# Patient Record
Sex: FEMALE | Race: BLACK OR AFRICAN AMERICAN | Employment: FULL TIME | URBAN - METROPOLITAN AREA
[De-identification: names, ages, dates, MRNs, and addresses within clinical notes are randomized per-mention and may not be internally consistent; named-entity substitution may affect disease eponyms.]

---

## 2018-04-12 PROBLEM — N92.6 IRREGULAR MENSTRUAL CYCLE: Status: ACTIVE | Noted: 2018-04-12

## 2018-06-21 PROBLEM — E66.01 SEVERE OBESITY (BMI 35.0-39.9): Status: ACTIVE | Noted: 2018-06-21

## 2018-06-21 PROBLEM — F43.81 PROLONGED GRIEF REACTION: Status: ACTIVE | Noted: 2018-06-21

## 2018-06-27 ENCOUNTER — HOSPITAL ENCOUNTER (EMERGENCY)
Age: 48
Discharge: HOME | End: 2018-06-27
Attending: EMERGENCY MEDICINE
Payer: OTHER GOVERNMENT

## 2018-06-27 VITALS
DIASTOLIC BLOOD PRESSURE: 79 MMHG | SYSTOLIC BLOOD PRESSURE: 119 MMHG | RESPIRATION RATE: 11 BRPM | OXYGEN SATURATION: 93 % | BODY MASS INDEX: 39.27 KG/M2 | TEMPERATURE: 98.3 F | HEIGHT: 61 IN | HEART RATE: 91 BPM | WEIGHT: 208 LBS

## 2018-06-27 DIAGNOSIS — R53.83 FATIGUE, UNSPECIFIED TYPE: Primary | ICD-10-CM

## 2018-06-27 DIAGNOSIS — M54.2 NECK PAIN: ICD-10-CM

## 2018-06-27 LAB
ALBUMIN SERPL-MCNC: 3.9 G/DL (ref 3.5–5)
ALBUMIN/GLOB SERPL: 0.8 {RATIO} (ref 1.2–3.5)
ALP SERPL-CCNC: 137 U/L (ref 50–136)
ALT SERPL-CCNC: 33 U/L (ref 12–65)
ANION GAP SERPL CALC-SCNC: 13 MMOL/L (ref 7–16)
AST SERPL-CCNC: 37 U/L (ref 15–37)
ATRIAL RATE: 104 BPM
BACTERIA URNS QL MICRO: 0 /HPF
BASOPHILS # BLD: 0 K/UL (ref 0–0.2)
BASOPHILS NFR BLD: 1 % (ref 0–2)
BILIRUB SERPL-MCNC: 0.5 MG/DL (ref 0.2–1.1)
BUN SERPL-MCNC: 20 MG/DL (ref 6–23)
CALCIUM SERPL-MCNC: 9.8 MG/DL (ref 8.3–10.4)
CALCULATED P AXIS, ECG09: 47 DEGREES
CALCULATED R AXIS, ECG10: 69 DEGREES
CALCULATED T AXIS, ECG11: 55 DEGREES
CASTS URNS QL MICRO: NORMAL /LPF
CHLORIDE SERPL-SCNC: 98 MMOL/L (ref 98–107)
CO2 SERPL-SCNC: 28 MMOL/L (ref 21–32)
CREAT SERPL-MCNC: 1.18 MG/DL (ref 0.6–1)
DIAGNOSIS, 93000: NORMAL
DIFFERENTIAL METHOD BLD: ABNORMAL
EOSINOPHIL # BLD: 0.1 K/UL (ref 0–0.8)
EOSINOPHIL NFR BLD: 2 % (ref 0.5–7.8)
EPI CELLS #/AREA URNS HPF: NORMAL /HPF
ERYTHROCYTE [DISTWIDTH] IN BLOOD BY AUTOMATED COUNT: 13 % (ref 11.9–14.6)
GLOBULIN SER CALC-MCNC: 5 G/DL (ref 2.3–3.5)
GLUCOSE SERPL-MCNC: 109 MG/DL (ref 65–100)
HCT VFR BLD AUTO: 41.9 % (ref 35.8–46.3)
HGB BLD-MCNC: 14.3 G/DL (ref 11.7–15.4)
IMM GRANULOCYTES # BLD: 0 K/UL (ref 0–0.5)
IMM GRANULOCYTES NFR BLD AUTO: 0 % (ref 0–5)
LYMPHOCYTES # BLD: 3.2 K/UL (ref 0.5–4.6)
LYMPHOCYTES NFR BLD: 46 % (ref 13–44)
MCH RBC QN AUTO: 28.5 PG (ref 26.1–32.9)
MCHC RBC AUTO-ENTMCNC: 34.1 G/DL (ref 31.4–35)
MCV RBC AUTO: 83.6 FL (ref 79.6–97.8)
MONOCYTES # BLD: 0.4 K/UL (ref 0.1–1.3)
MONOCYTES NFR BLD: 6 % (ref 4–12)
NEUTS SEG # BLD: 3.1 K/UL (ref 1.7–8.2)
NEUTS SEG NFR BLD: 45 % (ref 43–78)
P-R INTERVAL, ECG05: 140 MS
PLATELET # BLD AUTO: 467 K/UL (ref 150–450)
PMV BLD AUTO: 10.1 FL (ref 10.8–14.1)
POTASSIUM SERPL-SCNC: 4 MMOL/L (ref 3.5–5.1)
PROT SERPL-MCNC: 8.9 G/DL (ref 6.3–8.2)
Q-T INTERVAL, ECG07: 318 MS
QRS DURATION, ECG06: 76 MS
QTC CALCULATION (BEZET), ECG08: 418 MS
RBC # BLD AUTO: 5.01 M/UL (ref 4.05–5.25)
RBC #/AREA URNS HPF: NORMAL /HPF
SODIUM SERPL-SCNC: 139 MMOL/L (ref 136–145)
TROPONIN I SERPL-MCNC: <0.04 NG/ML (ref 0.02–0.05)
TSH SERPL DL<=0.005 MIU/L-ACNC: 5.95 UIU/ML (ref 0.36–3.74)
VENTRICULAR RATE, ECG03: 104 BPM
WBC # BLD AUTO: 6.8 K/UL (ref 4.3–11.1)
WBC URNS QL MICRO: NORMAL /HPF

## 2018-06-27 PROCEDURE — 80053 COMPREHEN METABOLIC PANEL: CPT | Performed by: EMERGENCY MEDICINE

## 2018-06-27 PROCEDURE — 81003 URINALYSIS AUTO W/O SCOPE: CPT | Performed by: EMERGENCY MEDICINE

## 2018-06-27 PROCEDURE — 93005 ELECTROCARDIOGRAM TRACING: CPT | Performed by: EMERGENCY MEDICINE

## 2018-06-27 PROCEDURE — 84443 ASSAY THYROID STIM HORMONE: CPT | Performed by: EMERGENCY MEDICINE

## 2018-06-27 PROCEDURE — 99285 EMERGENCY DEPT VISIT HI MDM: CPT | Performed by: EMERGENCY MEDICINE

## 2018-06-27 PROCEDURE — 84484 ASSAY OF TROPONIN QUANT: CPT | Performed by: EMERGENCY MEDICINE

## 2018-06-27 PROCEDURE — 96360 HYDRATION IV INFUSION INIT: CPT | Performed by: EMERGENCY MEDICINE

## 2018-06-27 PROCEDURE — 81015 MICROSCOPIC EXAM OF URINE: CPT | Performed by: EMERGENCY MEDICINE

## 2018-06-27 PROCEDURE — 85025 COMPLETE CBC W/AUTO DIFF WBC: CPT | Performed by: EMERGENCY MEDICINE

## 2018-06-27 PROCEDURE — 74011250636 HC RX REV CODE- 250/636: Performed by: EMERGENCY MEDICINE

## 2018-06-27 RX ADMIN — SODIUM CHLORIDE 1000 ML: 900 INJECTION, SOLUTION INTRAVENOUS at 13:22

## 2018-06-27 NOTE — ED NOTES
I have reviewed discharge instructions with the patient. The patient verbalized understanding. Patient left ED via Discharge Method: ambulatory to Home with (self). Opportunity for questions and clarification provided. Patient given 0 scripts. To continue your aftercare when you leave the hospital, you may receive an automated call from our care team to check in on how you are doing. This is a free service and part of our promise to provide the best care and service to meet your aftercare needs.  If you have questions, or wish to unsubscribe from this service please call [phone]. Thank you for Choosing our [facility] Emergency Department.

## 2018-06-27 NOTE — ED TRIAGE NOTES
Pt arrives via POV from home, pt ambulates to triage with ease, pt states she has been lethargic, had some back and neck pain, spasming. Pt states n/v/d. Pt states she has a lot of stress in her life, pt is crying in triage, was put on wellbutrin medication last week by PCP but has not been taking it because she is upset that that means she has a depression problem, states she will not go back to him. Pt refused recheck of BP after machine timed out.

## 2018-06-27 NOTE — DISCHARGE INSTRUCTIONS
Back Pain: Care Instructions  Your Care Instructions    Back pain has many possible causes. It is often related to problems with muscles and ligaments of the back. It may also be related to problems with the nerves, discs, or bones of the back. Moving, lifting, standing, sitting, or sleeping in an awkward way can strain the back. Sometimes you don't notice the injury until later. Arthritis is another common cause of back pain. Although it may hurt a lot, back pain usually improves on its own within several weeks. Most people recover in 12 weeks or less. Using good home treatment and being careful not to stress your back can help you feel better sooner. Follow-up care is a key part of your treatment and safety. Be sure to make and go to all appointments, and call your doctor if you are having problems. It's also a good idea to know your test results and keep a list of the medicines you take. How can you care for yourself at home? · Sit or lie in positions that are most comfortable and reduce your pain. Try one of these positions when you lie down:  ¨ Lie on your back with your knees bent and supported by large pillows. ¨ Lie on the floor with your legs on the seat of a sofa or chair. Jeffory Amass on your side with your knees and hips bent and a pillow between your legs. ¨ Lie on your stomach if it does not make pain worse. · Do not sit up in bed, and avoid soft couches and twisted positions. Bed rest can help relieve pain at first, but it delays healing. Avoid bed rest after the first day of back pain. · Change positions every 30 minutes. If you must sit for long periods of time, take breaks from sitting. Get up and walk around, or lie in a comfortable position. · Try using a heating pad on a low or medium setting for 15 to 20 minutes every 2 or 3 hours. Try a warm shower in place of one session with the heating pad. · You can also try an ice pack for 10 to 15 minutes every 2 to 3 hours.  Put a thin cloth between the ice pack and your skin. · Take pain medicines exactly as directed. ¨ If the doctor gave you a prescription medicine for pain, take it as prescribed. ¨ If you are not taking a prescription pain medicine, ask your doctor if you can take an over-the-counter medicine. · Take short walks several times a day. You can start with 5 to 10 minutes, 3 or 4 times a day, and work up to longer walks. Walk on level surfaces and avoid hills and stairs until your back is better. · Return to work and other activities as soon as you can. Continued rest without activity is usually not good for your back. · To prevent future back pain, do exercises to stretch and strengthen your back and stomach. Learn how to use good posture, safe lifting techniques, and proper body mechanics. When should you call for help? Call your doctor now or seek immediate medical care if:  ? · You have new or worsening numbness in your legs. ? · You have new or worsening weakness in your legs. (This could make it hard to stand up.)   ? · You lose control of your bladder or bowels. ? Watch closely for changes in your health, and be sure to contact your doctor if:  ? · Your pain gets worse. ? · You are not getting better after 2 weeks. Where can you learn more? Go to http://sharron-jennifer.info/. Enter G944 in the search box to learn more about \"Back Pain: Care Instructions. \"  Current as of: March 21, 2017  Content Version: 11.4  © 6978-7867 [employer]. Care instructions adapted under license by [employer] (which disclaims liability or warranty for this information). If you have questions about a medical condition or this instruction, always ask your healthcare professional. [place] [address] any warranty or liability for your use of this information.          Neck Pain: Care Instructions  Your Care Instructions    You can have neck pain anywhere from the bottom of your head to the top of your shoulders. It can spread to the upper back or arms. Injuries, painting a ceiling, sleeping with your neck twisted, staying in one position for too long, and many other activities can cause neck pain. Most neck pain gets better with home care. Your doctor may recommend medicine to relieve pain or relax your muscles. He or she may suggest exercise and physical therapy to increase flexibility and relieve stress. You may need to wear a special (cervical) collar to support your neck for a day or two. Follow-up care is a key part of your treatment and safety. Be sure to make and go to all appointments, and call your doctor if you are having problems. It's also a good idea to know your test results and keep a list of the medicines you take. How can you care for yourself at home? · Try using a heating pad on a low or medium setting for 15 to 20 minutes every 2 or 3 hours. Try a warm shower in place of one session with the heating pad. · You can also try an ice pack for 10 to 15 minutes every 2 to 3 hours. Put a thin cloth between the ice and your skin. · Take pain medicines exactly as directed. ¨ If the doctor gave you a prescription medicine for pain, take it as prescribed. ¨ If you are not taking a prescription pain medicine, ask your doctor if you can take an over-the-counter medicine. · If your doctor recommends a cervical collar, wear it exactly as directed. When should you call for help? Call your doctor now or seek immediate medical care if:  ? · You have new or worsening numbness in your arms, buttocks or legs. ? · You have new or worsening weakness in your arms or legs. (This could make it hard to stand up.)   ? · You lose control of your bladder or bowels. ? Watch closely for changes in your health, and be sure to contact your doctor if:  ? · Your neck pain is getting worse. ? · You are not getting better after 1 week. ? · You do not get better as expected.    Where can you learn more?  Go to http://sharron-jennifer.info/. Enter [id number] in the search box to learn more about \"Neck Pain: Care Instructions. \"  Current as of: March 21, 2017  Content Version: 11.4  © 9207-5258 [employer]. Care instructions adapted under license by [employer] (which disclaims liability or warranty for this information). If you have questions about a medical condition or this instruction, always ask your healthcare professional. [address] any warranty or liability for your use of this information. Fatigue: Care Instructions  Your Care Instructions    Fatigue is a feeling of tiredness, exhaustion, or lack of energy. You may feel fatigue because of too much or not enough activity. It can also come from stress, lack of sleep, boredom, and poor diet. Many medical problems, such as viral infections, can cause fatigue. Emotional problems, especially depression, are often the cause of fatigue. Fatigue is most often a symptom of another problem. Treatment for fatigue depends on the cause. For example, if you have fatigue because you have a certain health problem, treating this problem also treats your fatigue. If depression or anxiety is the cause, treatment may help. Follow-up care is a key part of your treatment and safety. Be sure to make and go to all appointments, and call your doctor if you are having problems. It's also a good idea to know your test results and keep a list of the medicines you take. How can you care for yourself at home? · Get regular exercise. But don't overdo it. Go back and forth between rest and exercise. · Get plenty of rest.  · Eat a healthy diet. Do not skip meals, especially breakfast.  · Reduce your use of caffeine, tobacco, and alcohol. Caffeine is most often found in coffee, tea, cola drinks, and chocolate. · Limit medicines that can cause fatigue. This includes tranquilizers and cold and allergy medicines.   When should you call for help? Watch closely for changes in your health, and be sure to contact your doctor if:  ? · You have new symptoms such as fever or a rash. ? · Your fatigue gets worse. ? · You have been feeling down, depressed, or hopeless. Or you may have lost interest in things that you usually enjoy. ? · You are not getting better as expected. Where can you learn more? Go to http://sharron-jennifer.info/. Enter U052 in the search box to learn more about \"Fatigue: Care Instructions. \"  Current as of: March 20, 2017  Content Version: 11.4  © 7914-3225 [employer]. Care instructions adapted under license by [employer] (which disclaims liability or warranty for this information). If you have questions about a medical condition or this instruction, always ask your healthcare professional. [address] any warranty or liability for your use of this information.

## 2018-06-27 NOTE — ED PROVIDER NOTES
HPI Comments: Patient is a 77-year-old female presenting with multiple complaints. She states over the past several weeks she's been feeling increasingly fatigued. She cannot explain why this might be. She also mentions she's had the burning sensation down her neck and back midline region. No tingling or numbness. She states this is associated with spasms in various locations throughout her body including her toes and fingers. Reports she saw her PCP recently was started on blood pressure medication as well as Wellbutrin. She has not been taking these. She denies any significant nausea, vomiting, shortness of breath, chest pain or abdominal pain. She has undergone a great deal of stressors over the past year. She states her son was murdered on Thanksgiving and her mom  at [place]    Patient is a 49 y.o. female presenting with fatigue. The history is provided by the patient. No  was used. Fatigue   Pertinent negatives include no shortness of breath, no confusion, no headaches and no nausea. Past Medical History:   Diagnosis Date    Anxiety 2013    Bacterial vaginosis 2014    Bronchitis     DJD (degenerative joint disease) of knee     Environmental allergies     Fibrocystic breast changes     HTN (hypertension) 2013    HTN (hypertension) 2013    Influenza B     Vaginitis 2014    Vitamin D deficiency 2014       Past Surgical History:   Procedure Laterality Date    BREAST SURGERY PROCEDURE UNLISTED      cyst removed left breast    HX BACK SURGERY      HX GYN      c-sect    HX HYSTERECTOMY           Family History:   Problem Relation Age of Onset    Hypertension Mother     Hypertension Father     Cancer Father        Social History     Social History    Marital status:      Spouse name: N/A    Number of children: N/A    Years of education: N/A     Occupational History    Not on file.      Social History Main Topics    Smoking status: Never Smoker    Smokeless tobacco: Never Used    Alcohol use No    Drug use: No    Sexual activity: Not on file     Other Topics Concern    Not on file     Social History Narrative         ALLERGIES: Codeine and Oxycodone-acetaminophen    Review of Systems   Constitutional: Positive for fatigue. Negative for fever. HENT: Negative for sore throat. Respiratory: Negative for cough, chest tightness and shortness of breath. Cardiovascular: Negative for leg swelling. Gastrointestinal: Negative for abdominal pain and nausea. Genitourinary: Negative for dysuria. Musculoskeletal: Positive for back pain, neck pain and neck stiffness. Neurological: Negative for syncope and headaches. Psychiatric/Behavioral: Negative for confusion. Vitals:    06/27/18 1257 06/27/18 1326 06/27/18 1346   BP:  (!) 156/94    Pulse: (!) 126  95   Resp: 16     Temp: 98.3 °F (36.8 °C)     SpO2: 97% 100%    Weight: 94.3 kg (208 lb)     Height: 5' 1\" (1.549 m)              Physical Exam   Constitutional: She is oriented to person, place, and time. She appears well-developed and well-nourished. No distress. HENT:   Head: Normocephalic and atraumatic. Eyes: Conjunctivae and EOM are normal. Pupils are equal, round, and reactive to light. Neck: Normal range of motion. Neck supple. Cardiovascular: Normal rate, regular rhythm and normal heart sounds. Pulmonary/Chest: Effort normal and breath sounds normal. No respiratory distress. She has no wheezes. She has no rales. Abdominal: Soft. She exhibits no distension. There is no tenderness. There is no rebound. Musculoskeletal: Normal range of motion. She exhibits tenderness. She exhibits no edema. Patient has no significant pain to palpation of the neck or upper back at this time. She states the pains went away. She has full range of motion   Neurological: She is alert and oriented to person, place, and time. Skin: Skin is warm and dry. No rash noted. She is not diaphoretic. Psychiatric: She has a normal mood and affect. Her behavior is normal.   Vitals reviewed. MDM  Number of Diagnoses or Management Options  Fatigue, unspecified type: new and does not require workup  Neck pain: new and does not require workup  Diagnosis management comments: Patient looks quite well and has been resting comfortably in the emergency department. TSH is slightly elevated. I suggested that she follow-up with a primary care provider. I believe the patient is depressed. She expressed understanding. Will discharge to home. Return precautions discussed      Mariah Gunn MD; 6/27/2018 @4:20 PM Voice dictation software was used during the making of this note. This software is not perfect and grammatical and other typographical errors may be present.   This note has not been proofread for errors.  ===================================================================         Amount and/or Complexity of Data Reviewed  Clinical lab tests: reviewed and ordered (Results for orders placed or performed during the hospital encounter of 06/27/18  -CBC WITH AUTOMATED DIFF       Result                                            Value                         Ref Range                       WBC                                               6.8                           4.3 - 11.1 K/uL                 RBC                                               5.01                          4.05 - 5.25 M/uL                HGB                                               14.3                          11.7 - 15.4 g/dL                HCT                                               41.9                          35.8 - 46.3 %                   MCV                                               83.6                          79.6 - 97.8 FL                  MCH                                               28.5                          26.1 - 32.9 PG                  [facility] 34.1                          31.4 - 35.0 g/dL                RDW                                               13.0                          11.9 - 14.6 %                   PLATELET                                          467 (H)                       150 - 450 K/uL                  MPV                                               10.1 (L)                      10.8 - 14.1 FL                  DF                                                AUTOMATED                                                     NEUTROPHILS                                       45                            43 - 78 %                       LYMPHOCYTES                                       46 (H)                        13 - 44 %                       MONOCYTES                                         6                             4.0 - 12.0 %                    EOSINOPHILS                                       2                             0.5 - 7.8 %                     BASOPHILS                                         1                             0.0 - 2.0 %                     IMMATURE GRANULOCYTES                             0                             0.0 - 5.0 %                     ABS. NEUTROPHILS                                  3.1                           1.7 - 8.2 K/UL                  ABS. LYMPHOCYTES                                  3.2                           0.5 - 4.6 K/UL                  ABS. MONOCYTES                                    0.4                           0.1 - 1.3 K/UL                  ABS. EOSINOPHILS                                  0.1                           0.0 - 0.8 K/UL                  ABS. BASOPHILS                                    0.0                           0.0 - 0.2 K/UL                  ABS. IMM.  GRANS.                                  0.0                           0.0 - 0.5 K/UL             -METABOLIC PANEL, COMPREHENSIVE       Result Value                         Ref Range                       Sodium                                            139                           136 - 145 mmol/L                Potassium                                         4.0                           3.5 - 5.1 mmol/L                Chloride                                          98                            98 - 107 mmol/L                 CO2                                               28                            21 - 32 mmol/L                  Anion gap                                         13                            7 - 16 mmol/L                   Glucose                                           109 (H)                       65 - 100 mg/dL                  BUN                                               20                            6 - 23 MG/DL                    Creatinine                                        1.18 (H)                      0.6 - 1.0 MG/DL                 GFR est AA                                        >60                           >60 ml/min/1.73m2               GFR est non-AA                                    52 (L)                        >60 ml/min/1.73m2               Calcium                                           9.8                           8.3 - 10.4 MG/DL                Bilirubin, total                                  0.5                           0.2 - 1.1 MG/DL                 ALT (SGPT)                                        33                            12 - 65 U/L                     AST (SGOT)                                        37                            15 - 37 U/L                     Alk. phosphatase                                  137 (H)                       50 - 136 U/L                    Protein, total                                    8.9 (H)                       6.3 - 8.2 g/dL                  Albumin                                           3.9                           3.5 - 5.0 g/dL                  Globulin                                          5.0 (H)                       2.3 - 3.5 g/dL                  A-G Ratio                                         0.8 (L)                       1.2 - 3.5                  -URINE MICROSCOPIC       Result                                            Value                         Ref Range                       WBC                                               0-3                           0 /hpf                          RBC                                               0-3                           0 /hpf                          Epithelial cells                                  3-5                           0 /hpf                          Bacteria                                          0                             0 /hpf                          Casts                                             3-5                           0 /lpf                     -TSH 3RD GENERATION       Result                                            Value                         Ref Range                       TSH                                               5.954 (H)                     0.358 - 3.740 uIU/mL       -TROPONIN I       Result                                            Value                         Ref Range                       Troponin-I, Qt.                                   <0.04                         0.02 - 0.05 NG/ML          -EKG, 12 LEAD, INITIAL       Result                                            Value                         Ref Range                       Ventricular Rate                                  104                           BPM                             Atrial Rate                                       104                           BPM                             P-R Interval                                      140                           ms                              QRS Duration 76                            ms                              Q-T Interval                                      318                           ms                              QTC Calculation (Bezet)                           418                           ms                              Calculated P Axis                                 47                            degrees                         Calculated R Axis                                 69                            degrees                         Calculated T Axis                                 55                            degrees                         Diagnosis                                                                                                   Sinus tachycardia   Otherwise normal ECG     )  Review and summarize past medical records: yes  Independent visualization of images, tracings, or specimens: yes    Risk of Complications, Morbidity, and/or Mortality  Presenting problems: moderate  Diagnostic procedures: moderate  Management options: moderate    Patient Progress  Patient progress: stable        ED Course       Procedures

## 2021-07-16 ENCOUNTER — HOSPITAL ENCOUNTER (OUTPATIENT)
Dept: LAB | Age: 51
Discharge: HOME | End: 2021-07-16
Payer: OTHER GOVERNMENT

## 2021-07-16 DIAGNOSIS — Z00.00 ENCOUNTER FOR MEDICAL EXAMINATION TO ESTABLISH CARE: ICD-10-CM

## 2021-07-16 DIAGNOSIS — Z13.29 SCREENING FOR THYROID DISORDER: ICD-10-CM

## 2021-07-16 DIAGNOSIS — Z13.6 SCREENING FOR ISCHEMIC HEART DISEASE: ICD-10-CM

## 2021-07-16 DIAGNOSIS — Z11.59 NEED FOR HEPATITIS C SCREENING TEST: ICD-10-CM

## 2021-07-16 PROBLEM — I10 HYPERTENSION: Status: ACTIVE | Noted: 2021-07-16

## 2021-07-16 LAB
ALBUMIN SERPL-MCNC: 3.6 G/DL (ref 3.5–5)
ALBUMIN/GLOB SERPL: 1.1 {RATIO} (ref 1.2–3.5)
ALP SERPL-CCNC: 102 U/L (ref 50–136)
ALT SERPL-CCNC: 34 U/L (ref 12–65)
ANION GAP SERPL CALC-SCNC: 7 MMOL/L (ref 7–16)
AST SERPL-CCNC: 22 U/L (ref 15–37)
BASOPHILS # BLD: 0.1 K/UL (ref 0–0.2)
BASOPHILS NFR BLD: 1 % (ref 0–2)
BILIRUB SERPL-MCNC: 0.3 MG/DL (ref 0.2–1.1)
BUN SERPL-MCNC: 14 MG/DL (ref 6–23)
CALCIUM SERPL-MCNC: 9.1 MG/DL (ref 8.3–10.4)
CHLORIDE SERPL-SCNC: 109 MMOL/L (ref 98–107)
CHOLEST SERPL-MCNC: 167 MG/DL
CO2 SERPL-SCNC: 26 MMOL/L (ref 21–32)
CREAT SERPL-MCNC: 0.81 MG/DL (ref 0.6–1)
DIFFERENTIAL METHOD BLD: ABNORMAL
EOSINOPHIL # BLD: 0.2 K/UL (ref 0–0.8)
EOSINOPHIL NFR BLD: 3 % (ref 0.5–7.8)
ERYTHROCYTE [DISTWIDTH] IN BLOOD BY AUTOMATED COUNT: 13.4 % (ref 11.9–14.6)
GLOBULIN SER CALC-MCNC: 3.4 G/DL (ref 2.3–3.5)
GLUCOSE SERPL-MCNC: 109 MG/DL (ref 65–100)
HCT VFR BLD AUTO: 35.5 % (ref 35.8–46.3)
HCV AB SER QL: NONREACTIVE
HDLC SERPL-MCNC: 51 MG/DL (ref 40–60)
HDLC SERPL: 3.3 {RATIO}
HGB BLD-MCNC: 11.4 G/DL (ref 11.7–15.4)
IMM GRANULOCYTES # BLD AUTO: 0 K/UL (ref 0–0.5)
IMM GRANULOCYTES NFR BLD AUTO: 0 % (ref 0–5)
LDLC SERPL CALC-MCNC: 100.6 MG/DL
LYMPHOCYTES # BLD: 3 K/UL (ref 0.5–4.6)
LYMPHOCYTES NFR BLD: 47 % (ref 13–44)
MCH RBC QN AUTO: 28.4 PG (ref 26.1–32.9)
MCHC RBC AUTO-ENTMCNC: 32.1 G/DL (ref 31.4–35)
MCV RBC AUTO: 88.3 FL (ref 79.6–97.8)
MONOCYTES # BLD: 0.3 K/UL (ref 0.1–1.3)
MONOCYTES NFR BLD: 5 % (ref 4–12)
NEUTS SEG # BLD: 2.8 K/UL (ref 1.7–8.2)
NEUTS SEG NFR BLD: 44 % (ref 43–78)
NRBC # BLD: 0 K/UL (ref 0–0.2)
PLATELET # BLD AUTO: 420 K/UL (ref 150–450)
PMV BLD AUTO: 10.6 FL (ref 9.4–12.3)
POTASSIUM SERPL-SCNC: 4 MMOL/L (ref 3.5–5.1)
PROT SERPL-MCNC: 7 G/DL (ref 6.3–8.2)
RBC # BLD AUTO: 4.02 M/UL (ref 4.05–5.2)
SODIUM SERPL-SCNC: 142 MMOL/L (ref 136–145)
T4 FREE SERPL-MCNC: 1 NG/DL (ref 0.78–1.46)
TRIGL SERPL-MCNC: 77 MG/DL (ref 35–150)
TSH SERPL DL<=0.005 MIU/L-ACNC: 3.83 UIU/ML (ref 0.36–3.74)
VLDLC SERPL CALC-MCNC: 15.4 MG/DL (ref 6–23)
WBC # BLD AUTO: 6.3 K/UL (ref 4.3–11.1)

## 2021-07-16 PROCEDURE — 85025 COMPLETE CBC W/AUTO DIFF WBC: CPT

## 2021-07-16 PROCEDURE — 84443 ASSAY THYROID STIM HORMONE: CPT

## 2021-07-16 PROCEDURE — 36415 COLL VENOUS BLD VENIPUNCTURE: CPT

## 2021-07-16 PROCEDURE — 84439 ASSAY OF FREE THYROXINE: CPT

## 2021-07-16 PROCEDURE — 80053 COMPREHEN METABOLIC PANEL: CPT

## 2021-07-16 PROCEDURE — 86803 HEPATITIS C AB TEST: CPT

## 2021-07-16 PROCEDURE — 80061 LIPID PANEL: CPT

## 2021-07-30 PROBLEM — R73.01 ELEVATED FASTING GLUCOSE: Status: ACTIVE | Noted: 2020-04-28

## 2021-08-14 ENCOUNTER — HOSPITAL ENCOUNTER (EMERGENCY)
Age: 51
Discharge: HOME | End: 2021-08-14
Attending: EMERGENCY MEDICINE
Payer: OTHER GOVERNMENT

## 2021-08-14 ENCOUNTER — APPOINTMENT (OUTPATIENT)
Dept: GENERAL RADIOLOGY | Age: 51
End: 2021-08-14
Attending: EMERGENCY MEDICINE
Payer: OTHER GOVERNMENT

## 2021-08-14 VITALS
BODY MASS INDEX: 39.65 KG/M2 | WEIGHT: 210 LBS | RESPIRATION RATE: 16 BRPM | HEIGHT: 61 IN | OXYGEN SATURATION: 98 % | TEMPERATURE: 98 F | SYSTOLIC BLOOD PRESSURE: 120 MMHG | HEART RATE: 106 BPM | DIASTOLIC BLOOD PRESSURE: 87 MMHG

## 2021-08-14 DIAGNOSIS — M54.9 ACUTE MIDLINE BACK PAIN, UNSPECIFIED BACK LOCATION: Primary | ICD-10-CM

## 2021-08-14 PROCEDURE — 72070 X-RAY EXAM THORAC SPINE 2VWS: CPT

## 2021-08-14 PROCEDURE — 74011250637 HC RX REV CODE- 250/637: Performed by: EMERGENCY MEDICINE

## 2021-08-14 PROCEDURE — 99283 EMERGENCY DEPT VISIT LOW MDM: CPT

## 2021-08-14 PROCEDURE — 72100 X-RAY EXAM L-S SPINE 2/3 VWS: CPT

## 2021-08-14 RX ORDER — HYDROCODONE BITARTRATE AND ACETAMINOPHEN 10; 325 MG/1; MG/1
1 TABLET ORAL
Status: COMPLETED | OUTPATIENT
Start: 2021-08-14 | End: 2021-08-14

## 2021-08-14 RX ORDER — IBUPROFEN 800 MG/1
800 TABLET ORAL
Status: COMPLETED | OUTPATIENT
Start: 2021-08-14 | End: 2021-08-14

## 2021-08-14 RX ORDER — IBUPROFEN 800 MG/1
800 TABLET ORAL
Qty: 20 TABLET | Refills: 0 | Status: SHIPPED | OUTPATIENT
Start: 2021-08-14 | End: 2021-08-21

## 2021-08-14 RX ORDER — CYCLOBENZAPRINE HCL 10 MG
10 TABLET ORAL
Status: COMPLETED | OUTPATIENT
Start: 2021-08-14 | End: 2021-08-14

## 2021-08-14 RX ORDER — CYCLOBENZAPRINE HCL 10 MG
10 TABLET ORAL
Qty: 30 TABLET | Refills: 0 | Status: SHIPPED | OUTPATIENT
Start: 2021-08-14 | End: 2021-09-24

## 2021-08-14 RX ORDER — TRAMADOL HYDROCHLORIDE 50 MG/1
50-100 TABLET ORAL
Qty: 20 TABLET | Refills: 0 | Status: SHIPPED | OUTPATIENT
Start: 2021-08-14 | End: 2021-08-17

## 2021-08-14 RX ADMIN — CYCLOBENZAPRINE 10 MG: 10 TABLET, FILM COATED ORAL at 14:57

## 2021-08-14 RX ADMIN — IBUPROFEN 800 MG: 800 TABLET, FILM COATED ORAL at 14:57

## 2021-08-14 RX ADMIN — HYDROCODONE BITARTRATE AND ACETAMINOPHEN 1 TABLET: 10; 325 TABLET ORAL at 14:57

## 2021-08-14 NOTE — ED TRIAGE NOTES
Pt with mid/low back pain that started about 2 hours ago. Pt denies injury or trauma.        Neil Gallardo RN

## 2021-08-14 NOTE — ED NOTES
I have reviewed discharge instructions with the patient. The patient verbalized understanding. Patient left ED via Discharge Method: ambulatory to Home with . Opportunity for questions and clarification provided. Patient given 3 scripts. To continue your aftercare when you leave the hospital, you may receive an automated call from our care team to check in on how you are doing. This is a free service and part of our promise to provide the best care and service to meet your aftercare needs.  If you have questions, or wish to unsubscribe from this service please call [phone]. Thank you for Choosing our [facility] Emergency Department.

## 2021-08-15 NOTE — ED PROVIDER NOTES
Chief complaint : back pain    HISTORY OF PRESENT ILLNESS :  Location : mid t-spine to LS junction, midline    Quality : sharp    Quantity : intermittent, comes and goes a few minutes at a time    Timing : maybe 2 hours ago,     Severity : severe at times, resolved currently    Context : was laying in bed working on her laptop, no fall or recent overuse scenario    Alleviating / exacerbating factors : sometimes worsens with movement  Abated now laying in left lateral decubitus position  Tried neither a tylenol nor an advil    Associated Symptoms :              Past Medical History:   Diagnosis Date    Anxiety 2013    Bacterial vaginosis 2014    Bronchitis     DJD (degenerative joint disease) of knee     Environmental allergies     Fibrocystic breast changes     HTN (hypertension) 2013    HTN (hypertension) 2013    Hypertension     Influenza B     Vaginitis 2014    Vitamin D deficiency 2014       Past Surgical History:   Procedure Laterality Date    HX BACK SURGERY      HX BREAST LUMPECTOMY      HX  SECTION      x2    HX GYN      c-sect    HX HERNIA REPAIR      HX HYSTERECTOMY      HX LAP CHOLECYSTECTOMY      WI BREAST SURGERY PROCEDURE UNLISTED      cyst removed left breast         Family History:   Problem Relation Age of Onset    Hypertension Mother     Diabetes Father     Hypertension Father     Cancer Father        Social History     Socioeconomic History    Marital status:      Spouse name: Not on file    Number of children: Not on file    Years of education: Not on file    Highest education level: Not on file   Occupational History    Not on file   Tobacco Use    Smoking status: Former Smoker    Smokeless tobacco: Never Used   Substance and Sexual Activity    Alcohol use:  Yes    Drug use: Never    Sexual activity: Not on file   Other Topics Concern    Not on file   Social History Narrative    ** Merged History Encounter **          Social Determinants of Health     Financial Resource Strain: Low Risk     Difficulty of Paying Living Expenses: Not hard at all   Food Insecurity: No Food Insecurity    Worried About Running Out of Food in the Last Year: Never true    Trinity of Food in the Last Year: Never true   Transportation Needs: No Transportation Needs    Lack of Transportation (Medical): No    Lack of Transportation (Non-Medical): No   Physical Activity: Insufficiently Active    Days of Exercise per Week: 7 days    Minutes of Exercise per Session: 20 min   Stress: No Stress Concern Present    Feeling of Stress : Not at all   Social Connections: Socially Integrated    Frequency of Communication with Friends and Family: More than three times a week    Frequency of Social Gatherings with Friends and Family: More than three times a week    Attends [employer] Services: More than 4 times per year   [employer] or Organizations: Yes    Attends Club or Organization Meetings: More than 4 times per year    Marital Status:    Intimate Partner Violence: Not At Risk    Fear of Current or Ex-Partner: No    Emotionally Abused: No    Physically Abused: No    Sexually Abused: No         ALLERGIES: Tylox [oxycodone-acetaminophen], Codeine, and Oxycodone-acetaminophen    Review of Systems   Constitutional: Negative for chills and fever. HENT: Negative for rhinorrhea and sore throat. Eyes: Negative for discharge and redness. Respiratory: Negative for cough and shortness of breath. Cardiovascular: Negative for chest pain and palpitations. Gastrointestinal: Negative for abdominal pain, anal bleeding, blood in stool, diarrhea, nausea and vomiting. Genitourinary: Negative for difficulty urinating and flank pain. Musculoskeletal: Positive for back pain. Negative for arthralgias, myalgias and neck stiffness. Skin: Negative for pallor and rash.    Neurological: Negative for dizziness, weakness, numbness and headaches. All other systems reviewed and are negative. Vitals:    08/14/21 1321   BP: 120/87   Pulse: (!) 106   Resp: 16   Temp: 98 °F (36.7 °C)   SpO2: 98%   Weight: 95.3 kg (210 lb)   Height: 5' 1\" (1.549 m)            Physical Exam  Vitals and nursing note reviewed. Constitutional:       General: She is not in acute distress. Appearance: Normal appearance. She is well-developed. She is not ill-appearing, toxic-appearing or diaphoretic. HENT:      Head: Normocephalic and atraumatic. Right Ear: External ear normal.      Left Ear: External ear normal.   Eyes:      General: No scleral icterus. Right eye: No discharge. Left eye: No discharge. Extraocular Movements: Extraocular movements intact. Conjunctiva/sclera: Conjunctivae normal.      Pupils: Pupils are equal, round, and reactive to light. Neck:      Thyroid: No thyromegaly. Trachea: Trachea normal.   Cardiovascular:      Rate and Rhythm: Normal rate and regular rhythm. Heart sounds: Normal heart sounds. No murmur heard. No gallop. Pulmonary:      Effort: Pulmonary effort is normal. No respiratory distress. Breath sounds: Normal breath sounds. No wheezing or rales. Abdominal:      Palpations: Abdomen is soft. There is no hepatomegaly, splenomegaly or pulsatile mass. Tenderness: There is no abdominal tenderness. There is no guarding. Musculoskeletal:         General: Tenderness present. Normal range of motion. Cervical back: Normal range of motion and neck supple. Normal range of motion. Thoracic back: Tenderness present. Lumbar back: Tenderness present. Back:    Lymphadenopathy:      Cervical: No cervical adenopathy. Skin:     General: Skin is warm and dry. Neurological:      General: No focal deficit present. Mental Status: She is alert and oriented to person, place, and time. Mental status is at baseline. Sensory: Sensation is intact.       Motor: No abnormal muscle tone. Deep Tendon Reflexes:      Reflex Scores:       Patellar reflexes are 2+ on the right side and 2+ on the left side. Achilles reflexes are 2+ on the right side and 2+ on the left side. Comments: cni 2-12 grossly   Psychiatric:         Mood and Affect: Mood normal.         Behavior: Behavior normal.          MDM  Number of Diagnoses or Management Options  Acute midline back pain, unspecified back location: new and requires workup  Diagnosis management comments: Medical decision making note:  atraumatic back pain, differential diagnosis to include renal colic, AAA, KOREY, pancreatitis, dissection. Suspect musculoskeletal origin  This concludes the \"medical decision making note\" part of this emergency department visit note. Amount and/or Complexity of Data Reviewed  Tests in the radiology section of CPT®: reviewed and ordered (XR SPINE LUMB 2 OR 3 V   Final Result        1. No acute radiographic abnormality. CPT code(s) 82197                 XR SPINE THORAC 2 V   Final Result        1.  No acute radiographic abnormality.)           Procedures

## 2022-03-18 PROBLEM — I10 HYPERTENSION: Status: ACTIVE | Noted: 2021-07-16

## 2022-03-19 PROBLEM — E66.01 SEVERE OBESITY WITH BODY MASS INDEX (BMI) OF 35.0 TO 39.9 WITH SERIOUS COMORBIDITY (HCC): Status: ACTIVE | Noted: 2018-06-21

## 2022-03-19 PROBLEM — N92.6 IRREGULAR MENSTRUAL CYCLE: Status: ACTIVE | Noted: 2018-04-12

## 2022-03-19 PROBLEM — F43.81 PROLONGED GRIEF REACTION: Status: ACTIVE | Noted: 2018-06-21

## 2022-03-20 PROBLEM — R73.01 ELEVATED FASTING GLUCOSE: Status: ACTIVE | Noted: 2020-04-28

## 2022-04-08 ENCOUNTER — TRANSCRIBE ORDER (OUTPATIENT)
Dept: SCHEDULING | Age: 52
End: 2022-04-08

## 2022-04-08 DIAGNOSIS — Z12.31 SCREENING MAMMOGRAM FOR BREAST CANCER: Primary | ICD-10-CM

## 2022-06-01 ENCOUNTER — APPOINTMENT (OUTPATIENT)
Dept: ULTRASOUND IMAGING | Age: 52
End: 2022-06-01
Payer: OTHER GOVERNMENT

## 2022-06-01 ENCOUNTER — APPOINTMENT (OUTPATIENT)
Dept: CT IMAGING | Age: 52
End: 2022-06-01
Payer: OTHER GOVERNMENT

## 2022-06-01 ENCOUNTER — HOSPITAL ENCOUNTER (EMERGENCY)
Age: 52
Discharge: HOME | End: 2022-06-01
Attending: EMERGENCY MEDICINE
Payer: OTHER GOVERNMENT

## 2022-06-01 ENCOUNTER — APPOINTMENT (OUTPATIENT)
Dept: GENERAL RADIOLOGY | Age: 52
End: 2022-06-01
Payer: OTHER GOVERNMENT

## 2022-06-01 VITALS
HEART RATE: 87 BPM | DIASTOLIC BLOOD PRESSURE: 59 MMHG | HEIGHT: 61 IN | BODY MASS INDEX: 39.68 KG/M2 | TEMPERATURE: 102 F | OXYGEN SATURATION: 95 % | RESPIRATION RATE: 17 BRPM | SYSTOLIC BLOOD PRESSURE: 102 MMHG

## 2022-06-01 DIAGNOSIS — L03.311 CELLULITIS OF ABDOMINAL WALL: Primary | ICD-10-CM

## 2022-06-01 DIAGNOSIS — M79.604 PAIN OF RIGHT LOWER EXTREMITY: ICD-10-CM

## 2022-06-01 LAB
ALBUMIN SERPL-MCNC: 3.3 G/DL (ref 3.5–5)
ALBUMIN/GLOB SERPL: 1 {RATIO}
ALP SERPL-CCNC: 128 U/L (ref 45–117)
ALT SERPL-CCNC: 108 U/L (ref 13–61)
ANION GAP SERPL CALC-SCNC: 20 MMOL/L (ref 7–16)
APPEARANCE UR: CLEAR
AST SERPL-CCNC: 97 U/L (ref 15–37)
BACTERIA URNS QL MICRO: NORMAL /HPF
BASOPHILS # BLD: 0.1 K/UL (ref 0–0.2)
BASOPHILS NFR BLD: 0 % (ref 0–2)
BILIRUB SERPL-MCNC: 0.5 MG/DL (ref 0.2–1.1)
BILIRUB UR QL: ABNORMAL
BUN SERPL-MCNC: 9 MG/DL (ref 7–18)
CALCIUM SERPL-MCNC: 9 MG/DL (ref 8.3–10.4)
CASTS URNS QL MICRO: 0 /LPF
CHLORIDE SERPL-SCNC: 104 MMOL/L (ref 98–107)
CO2 SERPL-SCNC: 19 MMOL/L (ref 21–32)
COLOR UR: YELLOW
CREAT SERPL-MCNC: 1.16 MG/DL (ref 0.6–1)
CRYSTALS URNS QL MICRO: 0 /LPF
DIFFERENTIAL METHOD BLD: ABNORMAL
EOSINOPHIL # BLD: 0.1 K/UL (ref 0–0.8)
EOSINOPHIL NFR BLD: 0 % (ref 0.5–7.8)
EPI CELLS #/AREA URNS HPF: NORMAL /HPF
ERYTHROCYTE [DISTWIDTH] IN BLOOD BY AUTOMATED COUNT: 13.7 % (ref 11.9–14.6)
FLUAV AG NPH QL IA: NEGATIVE
FLUBV AG NPH QL IA: NEGATIVE
GLOBULIN SER CALC-MCNC: 3.4 G/DL (ref 2.3–3.5)
GLUCOSE SERPL-MCNC: 81 MG/DL (ref 65–100)
GLUCOSE UR STRIP.AUTO-MCNC: NEGATIVE MG/DL
HCT VFR BLD AUTO: 30.9 % (ref 35.8–46.3)
HGB BLD-MCNC: 10.4 G/DL (ref 11.7–15.4)
HGB UR QL STRIP: ABNORMAL
IMM GRANULOCYTES # BLD AUTO: 0.1 K/UL (ref 0–0.5)
IMM GRANULOCYTES NFR BLD AUTO: 1 % (ref 0–5)
KETONES UR QL STRIP.AUTO: >160 MG/DL
LACTATE SERPL-SCNC: 1.6 MMOL/L (ref 0.4–2)
LEUKOCYTE ESTERASE UR QL STRIP.AUTO: NEGATIVE
LYMPHOCYTES # BLD: 0.9 K/UL (ref 0.5–4.6)
LYMPHOCYTES NFR BLD: 6 % (ref 13–44)
MCH RBC QN AUTO: 28.8 PG (ref 26.1–32.9)
MCHC RBC AUTO-ENTMCNC: 33.7 G/DL (ref 31.4–35)
MCV RBC AUTO: 85.6 FL (ref 79.6–97.8)
MONOCYTES # BLD: 0.8 K/UL (ref 0.1–1.3)
MONOCYTES NFR BLD: 5 % (ref 4–12)
MUCOUS THREADS URNS QL MICRO: 0 /LPF
NEUTS SEG # BLD: 12.5 K/UL (ref 1.7–8.2)
NEUTS SEG NFR BLD: 88 % (ref 43–78)
NITRITE UR QL STRIP.AUTO: NEGATIVE
NRBC # BLD: 0 K/UL (ref 0–0.2)
PH UR STRIP: 5.5 [PH] (ref 5–9)
PLATELET # BLD AUTO: 377 K/UL (ref 150–450)
PMV BLD AUTO: 10.3 FL (ref 9.4–12.3)
POTASSIUM SERPL-SCNC: 2.9 MMOL/L (ref 3.5–5.1)
PROCALCITONIN SERPL-MCNC: 0.92 NG/ML (ref 0–0.49)
PROT SERPL-MCNC: 6.7 G/DL (ref 6.4–8.2)
PROT UR STRIP-MCNC: NEGATIVE MG/DL
RBC # BLD AUTO: 3.61 M/UL (ref 4.05–5.2)
RBC #/AREA URNS HPF: 0 /HPF
SARS-COV-2 RDRP RESP QL NAA+PROBE: NOT DETECTED
SODIUM SERPL-SCNC: 143 MMOL/L (ref 136–145)
SOURCE: NORMAL
SP GR UR REFRACTOMETRY: 1.01 (ref 1–1.02)
SPECIMEN SOURCE: NORMAL
UROBILINOGEN UR QL STRIP.AUTO: 0.2 EU/DL (ref 0.2–1)
WBC # BLD AUTO: 14.3 K/UL (ref 4.3–11.1)
WBC URNS QL MICRO: NORMAL /HPF

## 2022-06-01 PROCEDURE — 99285 EMERGENCY DEPT VISIT HI MDM: CPT

## 2022-06-01 PROCEDURE — 87040 BLOOD CULTURE FOR BACTERIA: CPT

## 2022-06-01 PROCEDURE — 96366 THER/PROPH/DIAG IV INF ADDON: CPT

## 2022-06-01 PROCEDURE — 6360000004 HC RX CONTRAST MEDICATION: Performed by: EMERGENCY MEDICINE

## 2022-06-01 PROCEDURE — 96361 HYDRATE IV INFUSION ADD-ON: CPT

## 2022-06-01 PROCEDURE — 6370000000 HC RX 637 (ALT 250 FOR IP): Performed by: EMERGENCY MEDICINE

## 2022-06-01 PROCEDURE — 87635 SARS-COV-2 COVID-19 AMP PRB: CPT

## 2022-06-01 PROCEDURE — 81003 URINALYSIS AUTO W/O SCOPE: CPT

## 2022-06-01 PROCEDURE — 83605 ASSAY OF LACTIC ACID: CPT

## 2022-06-01 PROCEDURE — 6360000002 HC RX W HCPCS: Performed by: EMERGENCY MEDICINE

## 2022-06-01 PROCEDURE — 74177 CT ABD & PELVIS W/CONTRAST: CPT

## 2022-06-01 PROCEDURE — 93971 EXTREMITY STUDY: CPT

## 2022-06-01 PROCEDURE — 81015 MICROSCOPIC EXAM OF URINE: CPT

## 2022-06-01 PROCEDURE — 96365 THER/PROPH/DIAG IV INF INIT: CPT

## 2022-06-01 PROCEDURE — 80053 COMPREHEN METABOLIC PANEL: CPT

## 2022-06-01 PROCEDURE — 85025 COMPLETE CBC W/AUTO DIFF WBC: CPT

## 2022-06-01 PROCEDURE — 84145 PROCALCITONIN (PCT): CPT

## 2022-06-01 PROCEDURE — 87804 INFLUENZA ASSAY W/OPTIC: CPT

## 2022-06-01 PROCEDURE — 71045 X-RAY EXAM CHEST 1 VIEW: CPT

## 2022-06-01 PROCEDURE — 2580000003 HC RX 258: Performed by: EMERGENCY MEDICINE

## 2022-06-01 RX ORDER — AMOXICILLIN AND CLAVULANATE POTASSIUM 875; 125 MG/1; MG/1
1 TABLET, FILM COATED ORAL 2 TIMES DAILY
Qty: 20 TABLET | Refills: 0 | Status: SHIPPED | OUTPATIENT
Start: 2022-06-01 | End: 2022-06-11

## 2022-06-01 RX ORDER — SODIUM CHLORIDE, SODIUM LACTATE, POTASSIUM CHLORIDE, AND CALCIUM CHLORIDE .6; .31; .03; .02 G/100ML; G/100ML; G/100ML; G/100ML
1000 INJECTION, SOLUTION INTRAVENOUS
Status: COMPLETED | OUTPATIENT
Start: 2022-06-01 | End: 2022-06-01

## 2022-06-01 RX ORDER — HYDROCODONE BITARTRATE AND ACETAMINOPHEN 5; 325 MG/1; MG/1
1 TABLET ORAL
Status: COMPLETED | OUTPATIENT
Start: 2022-06-01 | End: 2022-06-01

## 2022-06-01 RX ORDER — KETOROLAC TROMETHAMINE 15 MG/ML
15 INJECTION, SOLUTION INTRAMUSCULAR; INTRAVENOUS ONCE
Status: DISCONTINUED | OUTPATIENT
Start: 2022-06-01 | End: 2022-06-01

## 2022-06-01 RX ORDER — ACETAMINOPHEN 500 MG
1000 TABLET ORAL
Status: COMPLETED | OUTPATIENT
Start: 2022-06-01 | End: 2022-06-01

## 2022-06-01 RX ORDER — POTASSIUM CHLORIDE 7.45 MG/ML
10 INJECTION INTRAVENOUS
Status: DISPENSED | OUTPATIENT
Start: 2022-06-01 | End: 2022-06-01

## 2022-06-01 RX ADMIN — POTASSIUM CHLORIDE 10 MEQ: 7.46 INJECTION, SOLUTION INTRAVENOUS at 18:23

## 2022-06-01 RX ADMIN — DIATRIZOATE MEGLUMINE AND DIATRIZOATE SODIUM 30 ML: 660; 100 LIQUID ORAL; RECTAL at 16:29

## 2022-06-01 RX ADMIN — SODIUM CHLORIDE, POTASSIUM CHLORIDE, SODIUM LACTATE AND CALCIUM CHLORIDE 1000 ML: 600; 310; 30; 20 INJECTION, SOLUTION INTRAVENOUS at 16:39

## 2022-06-01 RX ADMIN — HYDROCODONE BITARTRATE AND ACETAMINOPHEN 1 TABLET: 5; 325 TABLET ORAL at 19:50

## 2022-06-01 RX ADMIN — ACETAMINOPHEN 1000 MG: 500 TABLET, FILM COATED ORAL at 15:29

## 2022-06-01 RX ADMIN — IOPAMIDOL 100 ML: 755 INJECTION, SOLUTION INTRAVENOUS at 17:53

## 2022-06-01 RX ADMIN — SODIUM CHLORIDE, POTASSIUM CHLORIDE, SODIUM LACTATE AND CALCIUM CHLORIDE 1000 ML: 600; 310; 30; 20 INJECTION, SOLUTION INTRAVENOUS at 15:34

## 2022-06-01 ASSESSMENT — PAIN DESCRIPTION - LOCATION: LOCATION: LEG

## 2022-06-01 ASSESSMENT — PAIN SCALES - GENERAL
PAINLEVEL_OUTOF10: 6
PAINLEVEL_OUTOF10: 5

## 2022-06-01 ASSESSMENT — ENCOUNTER SYMPTOMS
VOMITING: 0
BACK PAIN: 0
COUGH: 0
FACIAL SWELLING: 0
SHORTNESS OF BREATH: 0
DIARRHEA: 0
ABDOMINAL PAIN: 0

## 2022-06-01 ASSESSMENT — PAIN DESCRIPTION - ORIENTATION: ORIENTATION: RIGHT

## 2022-06-01 ASSESSMENT — PAIN DESCRIPTION - DESCRIPTORS: DESCRIPTORS: ACHING

## 2022-06-01 NOTE — ED NOTES
I have reviewed discharge instructions with the patient. The patient verbalized understanding. Patient left ED via Discharge Method: ambulatory to Home with daughter. Opportunity for questions and clarification provided. Patient given 1 scripts. To continue your aftercare when you leave the hospital, you may receive an automated call from our care team to check in on how you are doing. This is a free service and part of our promise to provide the best care and service to meet your aftercare needs.  If you have questions, or wish to unsubscribe from this service please call [phone]. Thank you for Choosing our [facility] Emergency Department.         Otilio Mills RN  06/01/22 [id number]

## 2022-06-01 NOTE — ED PROVIDER NOTES
[facility] Provider Note                   PCP:                Efrain Perkins, APRN - [facility]               Age: 45 y.o. Sex: female     No diagnosis found. DISPOSITION         New Prescriptions    No medications on file       Orders Placed This Encounter   Procedures    Blood Culture 1    Blood Culture 2    COVID-19, Rapid    Rapid influenza A/B antigens    XR CHEST PORTABLE    US EXTREMITY RIGHT NON VASC LIMITED    Comprehensive Metabolic Panel    CBC with Auto Differential    Procalcitonin    Urinalysis with Reflex to Culture    Lactic Acid    Neurologic Status Assessment    Strict intake and output    Vital Signs Per Unit Routine    EKG 12 Lead    Saline lock IV         Karyn Roque is a 45 y.o. female who presents to the Emergency Department with chief complaint of    Chief Complaint   Patient presents with    Fever    Leg Pain      62-year-old female with a history of anxiety, hypertension, hysterectomy, hernia repair, cholecystectomy, gastric sleeve in [place] 2 weeks ago presents with generalized weakness for the past 2 days and fever that developed last night up to 104. She denies any cough, congestion, headache, neck stiffness, runny nose, chest pain, or shortness of breath. She denies any burning with urination, but has had some vaginal itching. She denies any new falls or injuries. She has had pain in her right lateral thigh for the past several months. She went to the emergency department last week for the same and was given Norco for pain, but states no work-up was done. She is also discussed with her primary doctor, but again no work-up was done. She denies any loss of bladder or bowel control. She has had some numbness in her right thigh for a few months as well. She states she has not had anything to eat or drink in 2 days because she was too weak to get out of bed and her family was out of town. No vomiting or diarrhea.   She thinks she was taking an antibiotic after the gastric sleeve, but is unsure of the name. She has not taken it for 2 days. Review of Systems   Constitutional: Positive for fatigue and fever. HENT: Negative for facial swelling. Eyes: Negative for visual disturbance. Respiratory: Negative for cough and shortness of breath. Cardiovascular: Negative for chest pain. Gastrointestinal: Negative for abdominal pain, diarrhea and vomiting. Genitourinary: Negative for dysuria. Musculoskeletal: Positive for myalgias. Negative for back pain and joint swelling. Skin: Positive for wound. Negative for rash. Neurological: Negative for speech difficulty. Psychiatric/Behavioral: Negative for confusion. All other systems reviewed and are negative. All other systems reviewed and are negative.       Past Medical History:   Diagnosis Date    Anxiety 2013    Bacterial vaginosis 2014    Bronchitis     DJD (degenerative joint disease) of knee     Environmental allergies     Fibrocystic breast changes     HTN (hypertension) 2013    HTN (hypertension) 2013    Hypertension     Influenza B     Vaginitis 2014    Vitamin D deficiency 2014        Past Surgical History:   Procedure Laterality Date    BACK SURGERY      BREAST LUMPECTOMY      BREAST SURGERY      cyst removed left breast     SECTION      x2    CHOLECYSTECTOMY, LAPAROSCOPIC      GYN      c-sect    HERNIA REPAIR      HYSTERECTOMY  1992        Family History   Problem Relation Age of Onset    Hypertension Father     Cancer Father     Hypertension Mother     Diabetes Father            Social Connections:     Frequency of Communication with Friends and Family: Not on file    Frequency of Social Gatherings with Friends and Family: Not on file    Attends Taoist Services: Not on file    Active Member of Clubs or Organizations: Not on file    Attends Club or Organization Meetings: Not on file    Marital Status: Not on file        Allergies   Allergen Reactions    Oxycodone-Acetaminophen Itching    Codeine Other (See Comments)     Pt states she is not allergic        Vitals signs and nursing note reviewed. Patient Vitals for the past 4 hrs:   Temp Pulse Resp BP SpO2   06/01/22 1429 (!) 102 °F (38.9 °C) (!) 110 16 110/70 96 %          Physical Exam  Vitals and nursing note reviewed. Constitutional:       Appearance: Normal appearance. She is well-developed. She is obese. HENT:      Head: Normocephalic and atraumatic. Nose: Nose normal.      Mouth/Throat:      Mouth: Mucous membranes are moist.   Eyes:      Extraocular Movements: Extraocular movements intact. Pupils: Pupils are equal, round, and reactive to light. Neck:      Meningeal: Brudzinski's sign and Kernig's sign absent. Cardiovascular:      Rate and Rhythm: Regular rhythm. Tachycardia present. Pulmonary:      Effort: Pulmonary effort is normal. No respiratory distress. Abdominal:      General: Abdomen is flat. There is no distension. Comments: Laparoscopic abdominal wounds with mild surrounding erythema and warmth. No drainage. Musculoskeletal:         General: Normal range of motion. Cervical back: Neck supple. No rigidity. Lumbar back: Normal. Negative right straight leg raise test and negative left straight leg raise test.      Right lower leg: Tenderness present. No swelling. No edema. Legs:    Skin:     General: Skin is warm and dry. Neurological:      General: No focal deficit present. Mental Status: She is alert. Mental status is at baseline. Cranial Nerves: Cranial nerves are intact. Sensory: Sensation is intact. Motor: Motor function is intact. Psychiatric:         Mood and Affect: Mood normal.          MDM  Number of Diagnoses or Management Options  Diagnosis management comments: Voice dictation software was used during the making of this note.   This software is not perfect and grammatical and other typographical errors may be present. This note has been proofread, but may still contain errors. Be Arreguin MD; 6/1/2022 @2:57 PM   ===================================================================  I wore appropriate PPE throughout this patient's ED visit. Be Arreguin MD, 2:57 PM    6:53 PM  Normal lactic. Slight leukocytosis. No obvious etiology for fever except for mild cellulitis surrounding abdominal wound. Blood cultures sent. Will place on antibiotics. Advise close primary care follow-up.            Amount and/or Complexity of Data Reviewed  Clinical lab tests: reviewed and ordered (Results for orders placed or performed during the hospital encounter of 06/01/22  -COVID-19, Rapid:   Specimen: Nasopharyngeal       Result                                            Value                         Ref Range                       Source                                            Nasopharyngeal                                                SARS-CoV-2, Rapid                                 Not detected                  NOTD                       -Rapid influenza A/B antigens:   Specimen: Nasal Washing       Result                                            Value                         Ref Range                       Influenza A Ag                                    Negative                      NEG                             Influenza B Ag                                    Negative                      NEG                             Source                                            Nasopharyngeal                                           -Comprehensive Metabolic Panel:        Result                                            Value                         Ref Range                       Sodium                                            143                           136 - 145 mmol/L                Potassium                                         2.9 (L) 3.5 - 5.1 mmol/L                Chloride                                          104                           98 - 107 mmol/L                 CO2                                               19 (L)                        21 - 32 mmol/L                  Anion Gap                                         20 (H)                        7.0 - 16.0 mmol/L               Glucose                                           81                            65 - 100 mg/dL                  BUN                                               9                             7.0 - 18.0 MG/DL                CREATININE                                        1.16 (H)                      0.6 - 1.0 MG/DL                 GFR                               >63                           >60 ml/min/1.73m2               GFR Non-                          52 (L)                        >60 ml/min/1.73m2               Calcium                                           9.0                           8.3 - 10.4 MG/DL                Total Bilirubin                                   0.5                           0.2 - 1.1 MG/DL                 ALT                                               108 (H)                       13.0 - 61.0 U/L                 AST                                               97 (H)                        15 - 37 U/L                     Alk Phosphatase                                   128 (H)                       45.0 - 117.0 U/L                Total Protein                                     6.7                           6.4 - 8.2 g/dL                  Albumin                                           3.3 (L)                       3.5 - 5.0 g/dL                  Globulin                                          3.4                           2.3 - 3.5 g/dL                  Albumin/Globulin Ratio                            1.0 -CBC with Auto Differential:        Result                                            Value                         Ref Range                       WBC                                               14.3 (H)                      4.3 - 11.1 K/uL                 RBC                                               3.61 (L)                      4.05 - 5.20 M/uL                Hemoglobin                                        10.4 (L)                      11.7 - 15.4 g/dL                Hematocrit                                        30.9 (L)                      35.8 - 46.3 %                   MCV                                               85.6                          79.6 - 97.8 FL                  MCH                                               28.8                          26.1 - 32.9 PG                  MCHC                                              33.7                          31.4 - 35.0 g/dL                RDW                                               13.7                          11.9 - 14.6 %                   Platelets                                         377                           150 - 450 K/uL                  MPV                                               10.3                          9.4 - 12.3 FL                   nRBC                                              0.00                          0.0 - 0.2 K/uL                  Differential Type                                 AUTOMATED                                                     Seg Neutrophils                                   88 (H)                        43 - 78 %                       Lymphocytes                                       6 (L)                         13 - 44 %                       Monocytes                                         5                             4.0 - 12.0 %                    Eosinophils %                                     0 (L)                         0.5 - 7.8 % Basophils                                         0                             0.0 - 2.0 %                     Immature Granulocytes                             1                             0.0 - 5.0 %                     Segs Absolute                                     12.5 (H)                      1.7 - 8.2 K/UL                  Absolute Lymph #                                  0.9                           0.5 - 4.6 K/UL                  Absolute Mono #                                   0.8                           0.1 - 1.3 K/UL                  Absolute Eos #                                    0.1                           0.0 - 0.8 K/UL                  Basophils Absolute                                0.1                           0.0 - 0.2 K/UL                  Absolute Immature Granulocyte                     0.1                           0.0 - 0.5 K/UL             -Lactic Acid:        Result                                            Value                         Ref Range                       Lactic Acid                                       1.60                          0.4 - 2.0 mmol/L           -Urinalysis:        Result                                            Value                         Ref Range                       Color, UA                                         YELLOW                                                        Appearance                                        CLEAR                                                         Specific [place], UA                              1.015                         1.001 - 1.023                   pH, Urine                                         5.5                           5.0 - 9.0                       Protein, UA                                       Negative                      NEG mg/dL                       Glucose, UA                                       Negative                      mg/dL                           Ketones, Urine >160 (A)                      NEG mg/dL                       Bilirubin Urine                                   SMALL (A)                     NEG                             Blood, Urine                                      Trace Intact (A)              NEG                             Urobilinogen, Urine                               0.2                           0.2 - 1.0 EU/dL                 Nitrite, Urine                                    Negative                      NEG                             Leukocyte Esterase, Urine                         Negative                      NEG                        -Urinalysis, Micro:        Result                                            Value                         Ref Range                       WBC, UA                                           0-3                           0 /hpf                          RBC, UA                                           0                             0 /hpf                          Epithelial Cells UA                               5-10                          0 /hpf                          BACTERIA, URINE                                   TRACE                         0 /hpf                          Casts                                             0                             0 /lpf                          Crystals                                          0                             0 /LPF                          Mucus, UA                                         0                             0 /lpf                     )  Tests in the radiology section of CPT®: ordered and reviewed (CT ABDOMEN PELVIS W IV CONTRAST Additional Contrast? Oral    Result Date: 6/1/2022  EXAMINATION: CT ABDOMEN PELVIS W IV CONTRAST 6/1/2022 6:11 PM ACCESSION NUMBER:  [id number] COMPARISON: None available INDICATION:  fever, gastric sleeve 2 weeks ago TECHNIQUE: Contiguous axial computed tomographic images were view. COMPARISON: None FINDINGS: Lungs: are clear. Costophrenic angles: are sharp. Heart size: is normal. Pulmonary vasculature: is unremarkable. Aorta: Unremarkable. Included portion of the upper abdomen: is unremarkable. Bones: No gross bony lesions. Other: None. Negative for pneumonia. Vascular duplex lower extremity venous right    Result Date: 6/1/2022  RIGHT LOWER EXTREMITY VENOUS DUPLEX ULTRASOUND. INDICATION: Lateral right thigh pain and fever. TECHNIQUE: Direct skin-contact high resolution grayscale images, color-flow Doppler and duplex waveform analysis. FINDINGS: There is compressibility, color-flow assignment and augmentation of the venous waveform with calf compression in the common femoral, superficial femoral and popliteal veins. Upper calf veins are unremarkable.      Negative for sonographic evidence of deep venous thrombosis right lower extremity.     )  Tests in the medicine section of CPT®: ordered and reviewed  Review and summarize past medical records: yes  Independent visualization of images, tracings, or specimens: yes        EKG 12 Lead    Date/Time: 6/1/2022 4:55 PM  Performed by: Maria A Kaufman MD  Authorized by: Maria A Kaufman MD     ECG reviewed by ED Physician in the absence of a cardiologist: yes    Interpretation:     Interpretation: abnormal    Rate:     ECG rate:  107    ECG rate assessment: tachycardic    Rhythm:     Rhythm: sinus tachycardia    Ectopy:     Ectopy: none    Conduction:     Conduction: normal    ST segments:     ST segments:  Normal  T waves:     T waves: normal          Labs Reviewed   CULTURE, BLOOD 1   CULTURE, BLOOD 1   COVID-19, RAPID   RAPID INFLUENZA A/B ANTIGENS   COMPREHENSIVE METABOLIC PANEL   CBC WITH AUTO DIFFERENTIAL   PROCALCITONIN   URINALYSIS WITH REFLEX TO CULTURE   LACTIC ACID        XR CHEST PORTABLE    (Results Pending)   US EXTREMITY RIGHT NON VASC LIMITED    (Results Pending)            Susan Coma Scale  Eye Opening: Spontaneous  Best Verbal Response: Oriented  Best Motor Response: Obeys commands  [place] Coma Scale Score: 15                     Voice dictation software was used during the making of this note. This software is not perfect and grammatical and other typographical errors may be present. This note has not been completely proofread for errors.         Rosmery Chambers MD  06/01/22 9729

## 2022-06-06 LAB
BACTERIA SPEC CULT: NORMAL
SERVICE CMNT-IMP: NORMAL

## 2022-10-19 ENCOUNTER — HOSPITAL ENCOUNTER (INPATIENT)
Age: 52
LOS: 1 days | Discharge: ANOTHER HOSPITAL | DRG: 683 | End: 2022-10-20
Attending: EMERGENCY MEDICINE | Admitting: FAMILY MEDICINE
Payer: OTHER GOVERNMENT

## 2022-10-19 ENCOUNTER — APPOINTMENT (OUTPATIENT)
Dept: GENERAL RADIOLOGY | Age: 52
DRG: 683 | End: 2022-10-19
Payer: OTHER GOVERNMENT

## 2022-10-19 DIAGNOSIS — E87.29 INCREASED ANION GAP METABOLIC ACIDOSIS: ICD-10-CM

## 2022-10-19 DIAGNOSIS — E86.0 DEHYDRATION: ICD-10-CM

## 2022-10-19 DIAGNOSIS — N17.9 ACUTE KIDNEY INJURY (HCC): Primary | ICD-10-CM

## 2022-10-19 DIAGNOSIS — R53.83 FATIGUE, UNSPECIFIED TYPE: ICD-10-CM

## 2022-10-19 PROBLEM — K21.9 GERD (GASTROESOPHAGEAL REFLUX DISEASE): Chronic | Status: ACTIVE | Noted: 2022-10-19

## 2022-10-19 PROBLEM — Z98.84 BARIATRIC SURGERY STATUS: Chronic | Status: ACTIVE | Noted: 2022-10-19

## 2022-10-19 PROBLEM — E66.01 SEVERE OBESITY WITH BODY MASS INDEX (BMI) OF 35.0 TO 39.9 WITH SERIOUS COMORBIDITY (HCC): Status: RESOLVED | Noted: 2018-06-21 | Resolved: 2022-10-19

## 2022-10-19 PROBLEM — E83.52 HYPERCALCEMIA: Status: ACTIVE | Noted: 2022-10-19

## 2022-10-19 PROBLEM — T73.0XXA STARVATION KETOACIDOSIS: Status: ACTIVE | Noted: 2022-10-19

## 2022-10-19 PROBLEM — K59.04 CHRONIC IDIOPATHIC CONSTIPATION: Status: ACTIVE | Noted: 2022-05-03

## 2022-10-19 PROBLEM — E03.9 HYPOTHYROIDISM: Status: ACTIVE | Noted: 2022-05-03

## 2022-10-19 LAB
ALBUMIN SERPL-MCNC: 4.3 G/DL (ref 3.5–5)
ALBUMIN/GLOB SERPL: 0.9 {RATIO} (ref 0.4–1.6)
ALP SERPL-CCNC: 122 U/L (ref 50–136)
ALT SERPL-CCNC: 20 U/L (ref 12–65)
ANION GAP SERPL CALC-SCNC: 18 MMOL/L (ref 2–11)
ANION GAP SERPL CALC-SCNC: 19 MMOL/L (ref 2–11)
ARTERIAL PATENCY WRIST A: ABNORMAL
AST SERPL-CCNC: 43 U/L (ref 15–37)
BASE DEFICIT BLDV-SCNC: 11.6 MMOL/L
BDY SITE: ABNORMAL
BILIRUB SERPL-MCNC: 0.8 MG/DL (ref 0.2–1.1)
BUN SERPL-MCNC: 20 MG/DL (ref 6–23)
BUN SERPL-MCNC: 21 MG/DL (ref 6–23)
CALCIUM SERPL-MCNC: 11.1 MG/DL (ref 8.3–10.4)
CALCIUM SERPL-MCNC: 11.5 MG/DL (ref 8.3–10.4)
CHLORIDE SERPL-SCNC: 109 MMOL/L (ref 101–110)
CHLORIDE SERPL-SCNC: 111 MMOL/L (ref 101–110)
CO2 SERPL-SCNC: 11 MMOL/L (ref 21–32)
CO2 SERPL-SCNC: 12 MMOL/L (ref 21–32)
CREAT SERPL-MCNC: 1.8 MG/DL (ref 0.6–1)
CREAT SERPL-MCNC: 1.8 MG/DL (ref 0.6–1)
D DIMER PPP FEU-MCNC: >20 UG/ML(FEU)
ERYTHROCYTE [DISTWIDTH] IN BLOOD BY AUTOMATED COUNT: 14.6 % (ref 11.9–14.6)
ETHANOL SERPL-MCNC: <3 MG/DL (ref 0–0.08)
GAS FLOW.O2 O2 DELIVERY SYS: ABNORMAL L/MIN
GLOBULIN SER CALC-MCNC: 4.7 G/DL (ref 2.8–4.5)
GLUCOSE SERPL-MCNC: 84 MG/DL (ref 65–100)
GLUCOSE SERPL-MCNC: 85 MG/DL (ref 65–100)
HCG UR QL: NEGATIVE
HCO3 BLDV-SCNC: 14.4 MMOL/L (ref 23–28)
HCT VFR BLD AUTO: 48.8 % (ref 35.8–46.3)
HGB BLD-MCNC: 15.5 G/DL (ref 11.7–15.4)
MAGNESIUM SERPL-MCNC: 2.6 MG/DL (ref 1.8–2.4)
MCH RBC QN AUTO: 28.3 PG (ref 26.1–32.9)
MCHC RBC AUTO-ENTMCNC: 31.8 G/DL (ref 31.4–35)
MCV RBC AUTO: 89.1 FL (ref 82–102)
NRBC # BLD: 0 K/UL (ref 0–0.2)
PCO2 BLDV: 32.6 MMHG (ref 41–51)
PH BLDV: 7.25 [PH] (ref 7.32–7.42)
PLATELET # BLD AUTO: 535 K/UL (ref 150–450)
PMV BLD AUTO: 10.7 FL (ref 9.4–12.3)
PO2 BLDV: 29 MMHG
POTASSIUM SERPL-SCNC: 4.2 MMOL/L (ref 3.5–5.1)
POTASSIUM SERPL-SCNC: 5.5 MMOL/L (ref 3.5–5.1)
PROT SERPL-MCNC: 9 G/DL (ref 6.3–8.2)
RBC # BLD AUTO: 5.48 M/UL (ref 4.05–5.2)
SAO2 % BLDV: 47.4 % (ref 65–88)
SERVICE CMNT-IMP: ABNORMAL
SERVICE CMNT-IMP: ABNORMAL
SODIUM SERPL-SCNC: 139 MMOL/L (ref 133–143)
SODIUM SERPL-SCNC: 141 MMOL/L (ref 133–143)
SPECIMEN TYPE: ABNORMAL
TSH W FREE THYROID IF ABNORMAL: 3.15 UIU/ML (ref 0.36–3.74)
WBC # BLD AUTO: 8.7 K/UL (ref 4.3–11.1)

## 2022-10-19 PROCEDURE — 83735 ASSAY OF MAGNESIUM: CPT

## 2022-10-19 PROCEDURE — 85379 FIBRIN DEGRADATION QUANT: CPT

## 2022-10-19 PROCEDURE — 82077 ASSAY SPEC XCP UR&BREATH IA: CPT

## 2022-10-19 PROCEDURE — 2580000003 HC RX 258: Performed by: EMERGENCY MEDICINE

## 2022-10-19 PROCEDURE — 1100000000 HC RM PRIVATE

## 2022-10-19 PROCEDURE — 81025 URINE PREGNANCY TEST: CPT

## 2022-10-19 PROCEDURE — 36600 WITHDRAWAL OF ARTERIAL BLOOD: CPT

## 2022-10-19 PROCEDURE — 82803 BLOOD GASES ANY COMBINATION: CPT

## 2022-10-19 PROCEDURE — 85027 COMPLETE CBC AUTOMATED: CPT

## 2022-10-19 PROCEDURE — 83605 ASSAY OF LACTIC ACID: CPT

## 2022-10-19 PROCEDURE — 71045 X-RAY EXAM CHEST 1 VIEW: CPT

## 2022-10-19 PROCEDURE — 99285 EMERGENCY DEPT VISIT HI MDM: CPT

## 2022-10-19 PROCEDURE — 36415 COLL VENOUS BLD VENIPUNCTURE: CPT

## 2022-10-19 PROCEDURE — 96360 HYDRATION IV INFUSION INIT: CPT

## 2022-10-19 PROCEDURE — 6370000000 HC RX 637 (ALT 250 FOR IP): Performed by: EMERGENCY MEDICINE

## 2022-10-19 PROCEDURE — 80053 COMPREHEN METABOLIC PANEL: CPT

## 2022-10-19 PROCEDURE — 84443 ASSAY THYROID STIM HORMONE: CPT

## 2022-10-19 PROCEDURE — 96361 HYDRATE IV INFUSION ADD-ON: CPT

## 2022-10-19 RX ORDER — MAGNESIUM HYDROXIDE/ALUMINUM HYDROXICE/SIMETHICONE 120; 1200; 1200 MG/30ML; MG/30ML; MG/30ML
30 SUSPENSION ORAL
Status: COMPLETED | OUTPATIENT
Start: 2022-10-19 | End: 2022-10-19

## 2022-10-19 RX ORDER — SODIUM CHLORIDE, SODIUM LACTATE, POTASSIUM CHLORIDE, AND CALCIUM CHLORIDE .6; .31; .03; .02 G/100ML; G/100ML; G/100ML; G/100ML
1000 INJECTION, SOLUTION INTRAVENOUS
Status: COMPLETED | OUTPATIENT
Start: 2022-10-19 | End: 2022-10-19

## 2022-10-19 RX ORDER — ACETAMINOPHEN 325 MG/1
650 TABLET ORAL EVERY 6 HOURS PRN
Status: CANCELLED | OUTPATIENT
Start: 2022-10-19

## 2022-10-19 RX ORDER — LEVOTHYROXINE SODIUM 0.05 MG/1
75 TABLET ORAL DAILY
Status: CANCELLED | OUTPATIENT
Start: 2022-10-20

## 2022-10-19 RX ORDER — SODIUM CHLORIDE 9 MG/ML
INJECTION, SOLUTION INTRAVENOUS PRN
Status: DISCONTINUED | OUTPATIENT
Start: 2022-10-19 | End: 2022-10-20 | Stop reason: HOSPADM

## 2022-10-19 RX ORDER — LEVOTHYROXINE SODIUM 0.05 MG/1
75 TABLET ORAL DAILY
Status: DISCONTINUED | OUTPATIENT
Start: 2022-10-20 | End: 2022-10-20 | Stop reason: HOSPADM

## 2022-10-19 RX ORDER — HEPARIN SODIUM 5000 [USP'U]/ML
5000 INJECTION, SOLUTION INTRAVENOUS; SUBCUTANEOUS EVERY 8 HOURS SCHEDULED
Status: DISCONTINUED | OUTPATIENT
Start: 2022-10-19 | End: 2022-10-20 | Stop reason: HOSPADM

## 2022-10-19 RX ORDER — ACETAMINOPHEN 325 MG/1
650 TABLET ORAL EVERY 6 HOURS PRN
Status: DISCONTINUED | OUTPATIENT
Start: 2022-10-19 | End: 2022-10-20 | Stop reason: HOSPADM

## 2022-10-19 RX ORDER — SODIUM CHLORIDE 9 MG/ML
INJECTION, SOLUTION INTRAVENOUS PRN
Status: CANCELLED | OUTPATIENT
Start: 2022-10-19

## 2022-10-19 RX ORDER — SODIUM CHLORIDE 0.9 % (FLUSH) 0.9 %
5-40 SYRINGE (ML) INJECTION PRN
Status: CANCELLED | OUTPATIENT
Start: 2022-10-19

## 2022-10-19 RX ORDER — LANOLIN ALCOHOL/MO/W.PET/CERES
100 CREAM (GRAM) TOPICAL DAILY
Status: DISCONTINUED | OUTPATIENT
Start: 2022-10-20 | End: 2022-10-20 | Stop reason: HOSPADM

## 2022-10-19 RX ORDER — ONDANSETRON 4 MG/1
4 TABLET, ORALLY DISINTEGRATING ORAL EVERY 8 HOURS PRN
Status: DISCONTINUED | OUTPATIENT
Start: 2022-10-19 | End: 2022-10-20 | Stop reason: HOSPADM

## 2022-10-19 RX ORDER — ONDANSETRON 4 MG/1
4 TABLET, ORALLY DISINTEGRATING ORAL EVERY 8 HOURS PRN
Status: CANCELLED | OUTPATIENT
Start: 2022-10-19

## 2022-10-19 RX ORDER — PANTOPRAZOLE SODIUM 40 MG/1
40 TABLET, DELAYED RELEASE ORAL
Status: DISCONTINUED | OUTPATIENT
Start: 2022-10-20 | End: 2022-10-20 | Stop reason: HOSPADM

## 2022-10-19 RX ORDER — SODIUM CHLORIDE 0.9 % (FLUSH) 0.9 %
5-40 SYRINGE (ML) INJECTION EVERY 12 HOURS SCHEDULED
Status: DISCONTINUED | OUTPATIENT
Start: 2022-10-19 | End: 2022-10-20 | Stop reason: HOSPADM

## 2022-10-19 RX ORDER — ACETAMINOPHEN 650 MG/1
650 SUPPOSITORY RECTAL EVERY 6 HOURS PRN
Status: DISCONTINUED | OUTPATIENT
Start: 2022-10-19 | End: 2022-10-20 | Stop reason: HOSPADM

## 2022-10-19 RX ORDER — SODIUM CHLORIDE 0.9 % (FLUSH) 0.9 %
5-40 SYRINGE (ML) INJECTION EVERY 12 HOURS SCHEDULED
Status: CANCELLED | OUTPATIENT
Start: 2022-10-20

## 2022-10-19 RX ORDER — HEPARIN SODIUM 5000 [USP'U]/ML
5000 INJECTION, SOLUTION INTRAVENOUS; SUBCUTANEOUS EVERY 8 HOURS SCHEDULED
Status: CANCELLED | OUTPATIENT
Start: 2022-10-20

## 2022-10-19 RX ORDER — ENOXAPARIN SODIUM 100 MG/ML
40 INJECTION SUBCUTANEOUS DAILY
Status: DISCONTINUED | OUTPATIENT
Start: 2022-10-20 | End: 2022-10-19

## 2022-10-19 RX ORDER — ACETAMINOPHEN 650 MG/1
650 SUPPOSITORY RECTAL EVERY 6 HOURS PRN
Status: CANCELLED | OUTPATIENT
Start: 2022-10-19

## 2022-10-19 RX ORDER — ONDANSETRON 2 MG/ML
4 INJECTION INTRAMUSCULAR; INTRAVENOUS EVERY 6 HOURS PRN
Status: DISCONTINUED | OUTPATIENT
Start: 2022-10-19 | End: 2022-10-20 | Stop reason: HOSPADM

## 2022-10-19 RX ORDER — SODIUM CHLORIDE 0.9 % (FLUSH) 0.9 %
5-40 SYRINGE (ML) INJECTION PRN
Status: DISCONTINUED | OUTPATIENT
Start: 2022-10-19 | End: 2022-10-20 | Stop reason: HOSPADM

## 2022-10-19 RX ORDER — PANTOPRAZOLE SODIUM 40 MG/1
40 TABLET, DELAYED RELEASE ORAL
Status: CANCELLED | OUTPATIENT
Start: 2022-10-20

## 2022-10-19 RX ORDER — ONDANSETRON 2 MG/ML
4 INJECTION INTRAMUSCULAR; INTRAVENOUS EVERY 6 HOURS PRN
Status: CANCELLED | OUTPATIENT
Start: 2022-10-19

## 2022-10-19 RX ORDER — LANOLIN ALCOHOL/MO/W.PET/CERES
100 CREAM (GRAM) TOPICAL DAILY
Status: CANCELLED | OUTPATIENT
Start: 2022-10-20

## 2022-10-19 RX ORDER — SODIUM CHLORIDE, SODIUM LACTATE, POTASSIUM CHLORIDE, AND CALCIUM CHLORIDE .6; .31; .03; .02 G/100ML; G/100ML; G/100ML; G/100ML
1000 INJECTION, SOLUTION INTRAVENOUS
Status: COMPLETED | OUTPATIENT
Start: 2022-10-19 | End: 2022-10-20

## 2022-10-19 RX ADMIN — SODIUM CHLORIDE, POTASSIUM CHLORIDE, SODIUM LACTATE AND CALCIUM CHLORIDE 1000 ML: 600; 310; 30; 20 INJECTION, SOLUTION INTRAVENOUS at 18:34

## 2022-10-19 RX ADMIN — SODIUM CHLORIDE, POTASSIUM CHLORIDE, SODIUM LACTATE AND CALCIUM CHLORIDE 1000 ML: 600; 310; 30; 20 INJECTION, SOLUTION INTRAVENOUS at 21:45

## 2022-10-19 RX ADMIN — ALUMINUM HYDROXIDE, MAGNESIUM HYDROXIDE, AND SIMETHICONE 30 ML: 200; 200; 20 SUSPENSION ORAL at 22:43

## 2022-10-19 NOTE — ED TRIAGE NOTES
Pt BIB EMS from home cc fatigue. Pt reports she started a fast 10 days ago, with nothing to eat in 5 days. Pt states she has been drinking water \"minimally\". Per EMS BP was 85/60, on arrival pt is 128/92.

## 2022-10-19 NOTE — ED PROVIDER NOTES
Emergency Department Provider Note                   PCP:                King Hardin MD               Age: 47 y.o. Sex: female       ICD-10-CM    1. Acute kidney injury ([facility]  N17.9       2. Increased anion gap metabolic acidosis  V50.61       3. Fatigue, unspecified type  R53.83       4. Dehydration  E86.0           DISPOSITION Admitted 10/19/2022 10:52:16 PM       MDM  Number of Diagnoses or Management Options  Acute kidney injury ([facility]  Dehydration  Fatigue, unspecified type  Increased anion gap metabolic acidosis  Diagnosis management comments: I have reevaluated patient several times and she looks much better. She no longer complains of shortness of breath. She has no further tachycardia room air oxygen sat have been normal.  Her initial blood was hemolyzed I repeated the BMP creatinine and anion gap still abnormal.  But potassium did normalize. I did originally order a D-dimer which I tried to cancel prior to resulting as I did not think she really needed it. It did get back elevated unfortunately I do not think getting a CTA tonight is a good idea given her increased creatinine and I truly doubt pulmonary embolism based on her clinical impression of improvement with IV fluids. I have discussed case with hospitalist and they will gratefully see her and take over care and management. Amount and/or Complexity of Data Reviewed  Clinical lab tests: ordered and reviewed  Tests in the radiology section of CPT®: ordered and reviewed  Independent visualization of images, tracings, or specimens: yes         ED Course as of 10/19/22 0823   Wed Oct 19, 2022   1900 EKG interpretation: Sinus tachycardia, rate of 131, normal axis, no acute ischemic change.  [BR]      ED Course User Index  [BR] Janeen Stahl DO       Orders Placed This Encounter   Procedures    XR CHEST PORTABLE    CBC    Comprehensive Metabolic Panel    TSH with Reflex    D-Dimer, Quantitative    Pregnancy, Urine Magnesium    Alcohol    Basic Metabolic Panel    Urinalysis w rflx microscopic    Lactic Acid    PTH, Intact    Vitamin D 25 Hydroxy    CBC with Auto Differential    Magnesium    Comprehensive Metabolic Panel    Blood Gas, Venous    ADULT DIET;  Regular; Low Fat/Low Chol/High Fiber/MAYNOR    Orthostatic blood pressure and pulse    Nursing communication    Vital signs per unit routine    Orthostatic blood pressure    Up with assistance    Notify physician    Daily weights    Intake and output    Full code    Inpatient consult to Dietitian    OT eval and treat    PT evaluation and treat    Initiate Oxygen Therapy Protocol    EKG 12 Lead    ADMIT TO INPATIENT        Medications given in the Emergency Department:  Medications   sodium chloride flush 0.9 % injection 5-40 mL (has no administration in time range)   sodium chloride flush 0.9 % injection 5-40 mL (has no administration in time range)   0.9 % sodium chloride infusion (has no administration in time range)   enoxaparin (LOVENOX) injection 40 mg (has no administration in time range)   ondansetron (ZOFRAN-ODT) disintegrating tablet 4 mg (has no administration in time range)     Or   ondansetron (ZOFRAN) injection 4 mg (has no administration in time range)   acetaminophen (TYLENOL) tablet 650 mg (has no administration in time range)     Or   acetaminophen (TYLENOL) suppository 650 mg (has no administration in time range)   thiamine tablet 100 mg (has no administration in time range)   sodium bicarbonate 150 mEq in dextrose 5 % 1,000 mL infusion (has no administration in time range)   pantoprazole (PROTONIX) tablet 40 mg (has no administration in time range)   levothyroxine (SYNTHROID) tablet 75 mcg (has no administration in time range)   lactated ringers bolus (0 mLs IntraVENous Stopped 10/19/22 2143)   lactated ringers bolus (1,000 mLs IntraVENous New Bag 10/19/22 2145)   aluminum & magnesium hydroxide-simethicone (MAALOX) 200-200-20 MG/5ML suspension 30 mL (30 mLs Oral Given 10/19/22 4883)       New Prescriptions    No medications on file        Loretta Fulton is a 47 y.o. female who presents to the Emergency Department with chief complaint of    Chief Complaint   Patient presents with    Fatigue      Patient brought in by EMS after having generalized fatigue. She has been doing some sort of spiritual fast for the last 5 to 10 days. Included plain vegetables and water. She reports that after a while everything started to taste bad and she has not been taking much orally. She states she has had some irregular urination that is frequent but not much. She was very fatigued and that is what EMS was called out for. When they arrived her blood pressure was 85/60. It has since improved. Patient does report that she has been somewhat short of breath to me as well. She denies any pleuritic chest pain or syncope. She seems very tired during the interview. All other systems reviewed and are negative unless otherwise stated in the History of Present Illness section.        Past Medical History:   Diagnosis Date    GARY (acute kidney injury) ([facility] 10/19/2022    Anxiety 2013    Bacterial vaginosis 2014    Bronchitis     DJD (degenerative joint disease) of knee     Environmental allergies     Fibrocystic breast changes     HTN (hypertension) 2013    HTN (hypertension) 2013    Hypertension     Influenza B     Vaginitis 2014    Vitamin D deficiency 2014        Past Surgical History:   Procedure Laterality Date    BACK SURGERY      BREAST LUMPECTOMY      BREAST SURGERY      cyst removed left breast     SECTION      x2    CHOLECYSTECTOMY, LAPAROSCOPIC      GYN      c-sect    HERNIA REPAIR      HYSTERECTOMY (CERVIX STATUS UNKNOWN)          Family History   Problem Relation Age of Onset    Hypertension Father     Cancer Father     Hypertension Mother     Diabetes Father         Social History     Socioeconomic History    Marital status:    Tobacco Use    Smoking status: Former    Smokeless tobacco: Never   Substance and Sexual Activity    Alcohol use: Yes    Drug use: Never   Social History Narrative    ** Merged History Encounter **             Allergies: Oxycodone-acetaminophen and Codeine    Previous Medications    LEVOTHYROXINE (SYNTHROID) 100 MCG TABLET    Take 100 mcg by mouth every morning (before breakfast)    TRIAMTERENE-HYDROCHLOROTHIAZIDE (MAXZIDE-25) 37.5-25 MG PER TABLET    Take 1 tablet by mouth daily        Vitals signs and nursing note reviewed. ED Triage Vitals [10/19/22 1728]   Enc Vitals Group      BP (!) 128/90      Heart Rate (!) 138      Resp 18      Temp 98.4 °F (36.9 °C)      Temp Source Oral      SpO2 98 %      Weight 155 lb (70.3 kg)      Height 5' 1\" (1.549 m)      Head Circumference       Peak Flow       Pain Score       Pain Loc       Pain Edu? Excl. in [address]? Physical Exam  Vitals and nursing note reviewed. Constitutional:       General: She is not in acute distress. Appearance: Normal appearance. She is not ill-appearing, toxic-appearing or diaphoretic. HENT:      Head: Normocephalic and atraumatic. Eyes:      General: No scleral icterus. Conjunctiva/sclera: Conjunctivae normal.   Cardiovascular:      Rate and Rhythm: Regular rhythm. Tachycardia present. Pulses: Normal pulses. Heart sounds: Normal heart sounds. Pulmonary:      Effort: Pulmonary effort is normal. No respiratory distress. Breath sounds: No stridor. No wheezing, rhonchi or rales. Abdominal:      General: Abdomen is flat. There is no distension. Palpations: Abdomen is soft. There is no mass. Tenderness: There is no abdominal tenderness. There is no guarding or rebound. Hernia: No hernia is present. Musculoskeletal:      Cervical back: Normal range of motion and neck supple. Skin:     General: Skin is warm. Capillary Refill: Capillary refill takes less than 2 seconds. Coloration: Skin is not jaundiced or pale. Findings: No bruising, erythema or rash. Neurological:      General: No focal deficit present. Mental Status: She is alert. Comments: Originally somnolent on reevaluation she has woke up. Psychiatric:         Mood and Affect: Mood normal.         Behavior: Behavior normal.         Thought Content:  Thought content normal.         Judgment: Judgment normal.        Procedures    ===================================  ED EKG Interpretation  EKG was interpreted in the absence of a cardiologist.    Rate: 132  EKG Interpretation: EKG Interpretation: sinus rhythm  ST Segments: Nonspecific ST segments - NO STEMI    Nikita Mirza MD 10:53 PM     Results Include:    Recent Results (from the past 24 hour(s))   CBC    Collection Time: 10/19/22  6:10 PM   Result Value Ref Range    WBC 8.7 4.3 - 11.1 K/uL    RBC 5.48 (H) 4.05 - 5.2 M/uL    Hemoglobin 15.5 (H) 11.7 - 15.4 g/dL    Hematocrit 48.8 (H) 35.8 - 46.3 %    MCV 89.1 82 - 102 FL    MCH 28.3 26.1 - 32.9 PG    MCHC 31.8 31.4 - 35.0 g/dL    RDW 14.6 11.9 - 14.6 %    Platelets 702 (H) 114 - 450 K/uL    MPV 10.7 9.4 - 12.3 FL    nRBC 0.00 0.0 - 0.2 K/uL   Comprehensive Metabolic Panel    Collection Time: 10/19/22  6:10 PM   Result Value Ref Range    Sodium 139 133 - 143 mmol/L    Potassium 5.5 (H) 3.5 - 5.1 mmol/L    Chloride 109 101 - 110 mmol/L    CO2 11 (L) 21 - 32 mmol/L    Anion Gap 19 (H) 2 - 11 mmol/L    Glucose 84 65 - 100 mg/dL    BUN 21 6 - 23 MG/DL    Creatinine 1.80 (H) 0.6 - 1.0 MG/DL    Est, Glom Filt Rate 33 (L) >60 ml/min/1.73m2    Calcium 11.5 (H) 8.3 - 10.4 MG/DL    Total Bilirubin 0.8 0.2 - 1.1 MG/DL    ALT 20 12 - 65 U/L    AST 43 (H) 15 - 37 U/L    Alk Phosphatase 122 50 - 136 U/L    Total Protein 9.0 (H) 6.3 - 8.2 g/dL    Albumin 4.3 3.5 - 5.0 g/dL    Globulin 4.7 (H) 2.8 - 4.5 g/dL    Albumin/Globulin Ratio 0.9 0.4 - 1.6     TSH with Reflex    Collection Time: 10/19/22  6:10 PM   Result Value Ref Range    TSH w Free Thyroid if Abnormal 3.15 0.358 - 3.740 UIU/ML   D-Dimer, Quantitative    Collection Time: 10/19/22  6:10 PM   Result Value Ref Range    D-Dimer, Quant >20.00 (H) <0.56 ug/ml(FEU)   Magnesium    Collection Time: 10/19/22  6:10 PM   Result Value Ref Range    Magnesium 2.6 (H) 1.8 - 2.4 mg/dL   Alcohol    Collection Time: 10/19/22  6:10 PM   Result Value Ref Range    Ethanol Lvl <3 MG/DL   Pregnancy, Urine    Collection Time: 10/19/22  9:09 PM   Result Value Ref Range    HCG(Urine) Pregnancy Test Negative NEG     Basic Metabolic Panel    Collection Time: 10/19/22  9:09 PM   Result Value Ref Range    Sodium 141 133 - 143 mmol/L    Potassium 4.2 3.5 - 5.1 mmol/L    Chloride 111 (H) 101 - 110 mmol/L    CO2 12 (L) 21 - 32 mmol/L    Anion Gap 18 (H) 2 - 11 mmol/L    Glucose 85 65 - 100 mg/dL    BUN 20 6 - 23 MG/DL    Creatinine 1.80 (H) 0.6 - 1.0 MG/DL    Est, Glom Filt Rate 33 (L) >60 ml/min/1.73m2    Calcium 11.1 (H) 8.3 - 10.4 MG/DL          XR CHEST PORTABLE   Final Result   Normal portable chest x-ray. Voice dictation software was used during the making of this note. This software is not perfect and grammatical and other typographical errors may be present. This note has not been completely proofread for errors.         Qasim Elam MD  10/19/22 1899

## 2022-10-20 ENCOUNTER — HOSPITAL ENCOUNTER (INPATIENT)
Age: 52
LOS: 1 days | Discharge: HOME | DRG: 683 | End: 2022-10-21
Attending: FAMILY MEDICINE | Admitting: FAMILY MEDICINE
Payer: OTHER GOVERNMENT

## 2022-10-20 VITALS
DIASTOLIC BLOOD PRESSURE: 85 MMHG | SYSTOLIC BLOOD PRESSURE: 115 MMHG | HEART RATE: 94 BPM | TEMPERATURE: 98.4 F | WEIGHT: 155 LBS | RESPIRATION RATE: 24 BRPM | HEIGHT: 61 IN | OXYGEN SATURATION: 99 % | BODY MASS INDEX: 29.27 KG/M2

## 2022-10-20 DIAGNOSIS — E87.29 STARVATION KETOACIDOSIS: Primary | ICD-10-CM

## 2022-10-20 DIAGNOSIS — T73.0XXA STARVATION KETOACIDOSIS: Primary | ICD-10-CM

## 2022-10-20 PROBLEM — I10 HYPERTENSION: Status: ACTIVE | Noted: 2021-07-16

## 2022-10-20 LAB
25(OH)D3 SERPL-MCNC: 32.7 NG/ML (ref 30–100)
ALBUMIN SERPL-MCNC: 3.3 G/DL (ref 3.5–5)
ALBUMIN/GLOB SERPL: 0.8 {RATIO} (ref 0.4–1.6)
ALP SERPL-CCNC: 100 U/L (ref 50–136)
ALT SERPL-CCNC: 15 U/L (ref 12–65)
ANION GAP SERPL CALC-SCNC: 15 MMOL/L (ref 2–11)
AST SERPL-CCNC: 15 U/L (ref 15–37)
BASOPHILS # BLD: 0.1 K/UL (ref 0–0.2)
BASOPHILS NFR BLD: 1 % (ref 0–2)
BILIRUB SERPL-MCNC: 0.6 MG/DL (ref 0.2–1.1)
BUN SERPL-MCNC: 18 MG/DL (ref 6–23)
CALCIUM SERPL-MCNC: 10 MG/DL (ref 8.3–10.4)
CALCIUM SERPL-MCNC: 10 MG/DL (ref 8.3–10.4)
CHLORIDE SERPL-SCNC: 108 MMOL/L (ref 101–110)
CO2 SERPL-SCNC: 16 MMOL/L (ref 21–32)
CREAT SERPL-MCNC: 1.43 MG/DL (ref 0.6–1)
DIFFERENTIAL METHOD BLD: NORMAL
EOSINOPHIL # BLD: 0.2 K/UL (ref 0–0.8)
EOSINOPHIL NFR BLD: 2 % (ref 0.5–7.8)
ERYTHROCYTE [DISTWIDTH] IN BLOOD BY AUTOMATED COUNT: 14.2 % (ref 11.9–14.6)
GLOBULIN SER CALC-MCNC: 4.1 G/DL (ref 2.8–4.5)
GLUCOSE SERPL-MCNC: 138 MG/DL (ref 65–100)
HCT VFR BLD AUTO: 37.2 % (ref 35.8–46.3)
HGB BLD-MCNC: 12.4 G/DL (ref 11.7–15.4)
IMM GRANULOCYTES # BLD AUTO: 0 K/UL (ref 0–0.5)
IMM GRANULOCYTES NFR BLD AUTO: 0 % (ref 0–5)
LACTATE SERPL-SCNC: 0.9 MMOL/L (ref 0.4–2)
LACTATE SERPL-SCNC: 1.7 MMOL/L (ref 0.4–2)
LYMPHOCYTES # BLD: 3.5 K/UL (ref 0.5–4.6)
LYMPHOCYTES NFR BLD: 40 % (ref 13–44)
MAGNESIUM SERPL-MCNC: 2 MG/DL (ref 1.8–2.4)
MCH RBC QN AUTO: 27.9 PG (ref 26.1–32.9)
MCHC RBC AUTO-ENTMCNC: 33.3 G/DL (ref 31.4–35)
MCV RBC AUTO: 83.6 FL (ref 82–102)
MONOCYTES # BLD: 0.6 K/UL (ref 0.1–1.3)
MONOCYTES NFR BLD: 7 % (ref 4–12)
NEUTS SEG # BLD: 4.3 K/UL (ref 1.7–8.2)
NEUTS SEG NFR BLD: 50 % (ref 43–78)
NRBC # BLD: 0 K/UL (ref 0–0.2)
PLATELET # BLD AUTO: 443 K/UL (ref 150–450)
PMV BLD AUTO: 10.2 FL (ref 9.4–12.3)
POTASSIUM SERPL-SCNC: 3.4 MMOL/L (ref 3.5–5.1)
PROT SERPL-MCNC: 7.4 G/DL (ref 6.3–8.2)
PTH-INTACT SERPL-MCNC: 17.4 PG/ML (ref 18.5–88)
RBC # BLD AUTO: 4.45 M/UL (ref 4.05–5.2)
SODIUM SERPL-SCNC: 139 MMOL/L (ref 133–143)
WBC # BLD AUTO: 8.7 K/UL (ref 4.3–11.1)

## 2022-10-20 PROCEDURE — 6360000002 HC RX W HCPCS: Performed by: FAMILY MEDICINE

## 2022-10-20 PROCEDURE — 6370000000 HC RX 637 (ALT 250 FOR IP): Performed by: INTERNAL MEDICINE

## 2022-10-20 PROCEDURE — 6370000000 HC RX 637 (ALT 250 FOR IP): Performed by: FAMILY MEDICINE

## 2022-10-20 PROCEDURE — 85025 COMPLETE CBC W/AUTO DIFF WBC: CPT

## 2022-10-20 PROCEDURE — 1100000000 HC RM PRIVATE

## 2022-10-20 PROCEDURE — 2580000003 HC RX 258: Performed by: INTERNAL MEDICINE

## 2022-10-20 PROCEDURE — 83970 ASSAY OF PARATHORMONE: CPT

## 2022-10-20 PROCEDURE — 2580000003 HC RX 258: Performed by: FAMILY MEDICINE

## 2022-10-20 PROCEDURE — 36415 COLL VENOUS BLD VENIPUNCTURE: CPT

## 2022-10-20 PROCEDURE — 97535 SELF CARE MNGMENT TRAINING: CPT

## 2022-10-20 PROCEDURE — 83735 ASSAY OF MAGNESIUM: CPT

## 2022-10-20 PROCEDURE — 2500000003 HC RX 250 WO HCPCS: Performed by: INTERNAL MEDICINE

## 2022-10-20 PROCEDURE — 83605 ASSAY OF LACTIC ACID: CPT

## 2022-10-20 PROCEDURE — 97530 THERAPEUTIC ACTIVITIES: CPT

## 2022-10-20 PROCEDURE — 80053 COMPREHEN METABOLIC PANEL: CPT

## 2022-10-20 PROCEDURE — 2500000003 HC RX 250 WO HCPCS: Performed by: FAMILY MEDICINE

## 2022-10-20 PROCEDURE — 97161 PT EVAL LOW COMPLEX 20 MIN: CPT

## 2022-10-20 PROCEDURE — 97165 OT EVAL LOW COMPLEX 30 MIN: CPT

## 2022-10-20 PROCEDURE — 82306 VITAMIN D 25 HYDROXY: CPT

## 2022-10-20 RX ORDER — ACETAMINOPHEN 650 MG/1
650 SUPPOSITORY RECTAL EVERY 6 HOURS PRN
Status: DISCONTINUED | OUTPATIENT
Start: 2022-10-20 | End: 2022-10-21 | Stop reason: HOSPADM

## 2022-10-20 RX ORDER — SODIUM CHLORIDE 0.9 % (FLUSH) 0.9 %
5-40 SYRINGE (ML) INJECTION PRN
Status: DISCONTINUED | OUTPATIENT
Start: 2022-10-20 | End: 2022-10-21 | Stop reason: HOSPADM

## 2022-10-20 RX ORDER — ONDANSETRON 4 MG/1
4 TABLET, ORALLY DISINTEGRATING ORAL EVERY 8 HOURS PRN
Status: DISCONTINUED | OUTPATIENT
Start: 2022-10-20 | End: 2022-10-21 | Stop reason: HOSPADM

## 2022-10-20 RX ORDER — SODIUM CHLORIDE 9 MG/ML
INJECTION, SOLUTION INTRAVENOUS PRN
Status: DISCONTINUED | OUTPATIENT
Start: 2022-10-20 | End: 2022-10-21 | Stop reason: HOSPADM

## 2022-10-20 RX ORDER — HEPARIN SODIUM 5000 [USP'U]/ML
5000 INJECTION, SOLUTION INTRAVENOUS; SUBCUTANEOUS EVERY 8 HOURS SCHEDULED
Status: DISCONTINUED | OUTPATIENT
Start: 2022-10-20 | End: 2022-10-21 | Stop reason: HOSPADM

## 2022-10-20 RX ORDER — FOLIC ACID/MULTIVIT,IRON,MINER .4-18-35
1 TABLET,CHEWABLE ORAL 2 TIMES DAILY
Status: DISCONTINUED | OUTPATIENT
Start: 2022-10-20 | End: 2022-10-21 | Stop reason: HOSPADM

## 2022-10-20 RX ORDER — ACETAMINOPHEN 325 MG/1
650 TABLET ORAL EVERY 6 HOURS PRN
Status: DISCONTINUED | OUTPATIENT
Start: 2022-10-20 | End: 2022-10-21 | Stop reason: HOSPADM

## 2022-10-20 RX ORDER — SODIUM CHLORIDE 0.9 % (FLUSH) 0.9 %
5-40 SYRINGE (ML) INJECTION EVERY 12 HOURS SCHEDULED
Status: DISCONTINUED | OUTPATIENT
Start: 2022-10-20 | End: 2022-10-21 | Stop reason: HOSPADM

## 2022-10-20 RX ORDER — SIMETHICONE 80 MG
80 TABLET,CHEWABLE ORAL EVERY 6 HOURS PRN
Status: DISCONTINUED | OUTPATIENT
Start: 2022-10-20 | End: 2022-10-21 | Stop reason: HOSPADM

## 2022-10-20 RX ORDER — CALCIUM CARBONATE 200(500)MG
500 TABLET,CHEWABLE ORAL 3 TIMES DAILY PRN
Status: DISCONTINUED | OUTPATIENT
Start: 2022-10-20 | End: 2022-10-21 | Stop reason: HOSPADM

## 2022-10-20 RX ORDER — LANOLIN ALCOHOL/MO/W.PET/CERES
100 CREAM (GRAM) TOPICAL DAILY
Status: DISCONTINUED | OUTPATIENT
Start: 2022-10-20 | End: 2022-10-21 | Stop reason: HOSPADM

## 2022-10-20 RX ORDER — SIMETHICONE 80 MG
80 TABLET,CHEWABLE ORAL EVERY 6 HOURS PRN
COMMUNITY

## 2022-10-20 RX ORDER — OMEPRAZOLE 10 MG/1
10 CAPSULE, DELAYED RELEASE ORAL 2 TIMES DAILY
COMMUNITY

## 2022-10-20 RX ORDER — LEVOTHYROXINE SODIUM 0.07 MG/1
75 TABLET ORAL DAILY
Status: DISCONTINUED | OUTPATIENT
Start: 2022-10-20 | End: 2022-10-21 | Stop reason: HOSPADM

## 2022-10-20 RX ORDER — PANTOPRAZOLE SODIUM 40 MG/1
40 TABLET, DELAYED RELEASE ORAL
Status: DISCONTINUED | OUTPATIENT
Start: 2022-10-20 | End: 2022-10-21 | Stop reason: HOSPADM

## 2022-10-20 RX ORDER — ONDANSETRON 2 MG/ML
4 INJECTION INTRAMUSCULAR; INTRAVENOUS EVERY 6 HOURS PRN
Status: DISCONTINUED | OUTPATIENT
Start: 2022-10-20 | End: 2022-10-21 | Stop reason: HOSPADM

## 2022-10-20 RX ADMIN — HEPARIN SODIUM 5000 UNITS: 5000 INJECTION INTRAVENOUS; SUBCUTANEOUS at 06:12

## 2022-10-20 RX ADMIN — SIMETHICONE 80 MG: 80 TABLET, CHEWABLE ORAL at 21:10

## 2022-10-20 RX ADMIN — HEPARIN SODIUM 5000 UNITS: 5000 INJECTION INTRAVENOUS; SUBCUTANEOUS at 15:09

## 2022-10-20 RX ADMIN — HEPARIN SODIUM 5000 UNITS: 5000 INJECTION INTRAVENOUS; SUBCUTANEOUS at 21:55

## 2022-10-20 RX ADMIN — SODIUM CHLORIDE, PRESERVATIVE FREE 10 ML: 5 INJECTION INTRAVENOUS at 21:57

## 2022-10-20 RX ADMIN — SIMETHICONE 80 MG: 80 TABLET, CHEWABLE ORAL at 02:01

## 2022-10-20 RX ADMIN — SODIUM BICARBONATE: 84 INJECTION, SOLUTION INTRAVENOUS at 03:27

## 2022-10-20 RX ADMIN — Medication 100 MG: at 08:53

## 2022-10-20 RX ADMIN — POTASSIUM BICARBONATE 40 MEQ: 782 TABLET, EFFERVESCENT ORAL at 08:53

## 2022-10-20 RX ADMIN — LEVOTHYROXINE SODIUM 75 MCG: 0.07 TABLET ORAL at 06:12

## 2022-10-20 RX ADMIN — ANTACID TABLETS 500 MG: 500 TABLET, CHEWABLE ORAL at 02:01

## 2022-10-20 RX ADMIN — SODIUM BICARBONATE: 84 INJECTION, SOLUTION INTRAVENOUS at 13:51

## 2022-10-20 RX ADMIN — Medication 1 TABLET: at 21:10

## 2022-10-20 RX ADMIN — SODIUM CHLORIDE, PRESERVATIVE FREE 10 ML: 5 INJECTION INTRAVENOUS at 08:56

## 2022-10-20 RX ADMIN — PANTOPRAZOLE SODIUM 40 MG: 40 TABLET, DELAYED RELEASE ORAL at 06:12

## 2022-10-20 NOTE — PROGRESS NOTES
Celestino Castro MS, RD, LD  Surgical Weight Loss Dietitian  [address] [place] [address], [address] [place] [address]  Kimberly Smith  Phone [phone]   Fax [phone]    Obesity Medicine Initial Nutrition Assessment     Assessment:  Pt is ~6 months s/p VSG in [facility]. She admits to following a 10-day fresh vegetable fast with nothing but water for past 5 days. Initial nutrition consult completed with Sera Anaya RD. Pt diet recall is not consistent with bariatric post-op recommendations - not taking adequate MVI supplements, not meeting protein goal of 60-80g daily, eating foods high in fat/carbs,  and admits to episodes of vomiting. Pt is not counting protein intake, but diet recall collected by Sera Anaya RD, indicates she is consuming ~30-40g pro/day. Anthropometrics:  Ht: 53, wt: 164#, 116% IBW, 29.02 BMI  Pre-operative weight was 220#. Pt has not been tracking wt loss, but states she does feel like she is still losing wt. Nutrition Diagnosis:  Limited adherence to nutrition-related recommendations R/T food choices consistent with recommendations as evidenced by s/p VSG and consuming foods high in fat, starches, sugars. Inadequate protein intake R/T difficulty consuming appropriate amount of dietary protein as evidenced by pt s/p VSG and reports consuming ~30-40g Pro/d. Intervention:   Provided program manual and instructed to read before follow-up diet visit in the morning. Monitoring and Evaluation:  Monitor for continued safe, supervised weight loss for VSG. Monitor for improved knowledge of nutrition recommendations for Regular Bariatric Diet. Follow for meeting daily goals of 60-80 g Pro and 64+ fl oz hydrating beverages. Will follow-up in morning to continue education and assess knowledge, set goals.      Celestino Castro MS, RD, LD

## 2022-10-20 NOTE — ED NOTES
TRANSFER - OUT REPORT:    Verbal report given to Terrell Horn RN on [facility]  being transferred to room [place] [address] for change in patient condition (admission)       Report consisted of patient's Situation, Background, Assessment and   Recommendations(SBAR). Information from the following report(s) Nurse Handoff Report and ED SBAR was reviewed with the receiving nurse. Lines:   Peripheral IV 10/19/22 Right Forearm (Active)        Opportunity for questions and clarification was provided.       Patient transported with:  Tech, pt transferred via EMS      Hollie Meza RN  10/20/22 0025

## 2022-10-20 NOTE — ED NOTES
Pt c/o acid reflux, requesting medication at this time. MD notified and aware.       Venice Castleman, RN  10/19/22 0794

## 2022-10-20 NOTE — H&P
Hospitalist History and Physical   Admit Date:  10/19/2022  5:29 PM   Name:  Rose Dubin   Age:  47 y.o. Sex:  female  :  1970   MRN:  [id number]   Room:  [place] [address]    Presenting Complaint: Fatigue     Reason(s) for Admission: GARY (acute kidney injury) ([facility] [N17.9]     History of Present Illness:   Rose Dubin is a 47 y.o. female with medical history of obesity s/p bariatric surgery who presented via EMS with cc fatigue associated with 10 day [employer] fast with no food in 5 days and minimal water intake. Per EMS BP 85/60 improved with fluids, on arrival /88 . HR noramlized with IVF. Labs significant for hemoconcenrated CBC. HCO3 11 with AG. Calcium 11.5 Scr 1.8  (BL 0.8) glucose 84. [facility] [place] [facility]. Has not been compliant with vitamins and management s/p bariatric surgery. She is not currently on any supplements. History of HTN, now off BP medicines following weight loss. Admits to increased urinary frequency and dysuria. D dimer was checked and >20 but hemolyzed. Review of Systems:  10 systems reviewed and negative except as noted in HPI. Assessment & Plan:     Principal Problem:    GARY (acute kidney injury) ([facility]  2/2 PRA +/- ATN associated with starvation, hypotension   Check UA   Bicarb-dextrose @ 125 cc /hr   Active Problems:    High anion gap metabolic acidosis  Plan: suspected 2/2 starvation ketoacidosis. Sepsis not suspected. Check LA. UA in process to assess for ketones. Starvation - supplement thiamine given risk of WE s/p bariatric surgery. Hypercalcemia  Plan: probably from severe dehydration. Check vitamin D, PTH in AM.       Hypothyroidism  Plan: resume synthroid. TSH wnl       GERD (gastroesophageal reflux disease)  Plan: PPI     Hypotension 2/2 hypovolemia - responded to IVF       Anticipated discharge needs:     N/a     Diet: ADULT DIET;  Regular; Low Fat/Low Chol/High Fiber/MAYNOR  VTE ppx: loveonx   Code status: Full Code    Hospital Problems:  Principal Problem:    GARY (acute kidney injury) ([facility]  Active Problems:    High anion gap metabolic acidosis    Starvation ketoacidosis    Hypercalcemia    Hypothyroidism    GERD (gastroesophageal reflux disease)    Vitamin D deficiency  Resolved Problems:    * No resolved hospital problems.  *       Past History:     Past Medical History:   Diagnosis Date    GARY (acute kidney injury) ([facility] 10/19/2022    Anxiety 2013    Bacterial vaginosis 2014    Bronchitis     DJD (degenerative joint disease) of knee     Environmental allergies     Fibrocystic breast changes     HTN (hypertension) 2013    HTN (hypertension) 2013    Hypertension     Influenza B     Vaginitis 2014    Vitamin D deficiency 2014       Past Surgical History:   Procedure Laterality Date    BACK SURGERY      BREAST LUMPECTOMY      BREAST SURGERY      cyst removed left breast     SECTION      x2    CHOLECYSTECTOMY, LAPAROSCOPIC      GYN      c-sect    HERNIA REPAIR      HYSTERECTOMY (CERVIX STATUS UNKNOWN)          Social History     Tobacco Use    Smoking status: Former    Smokeless tobacco: Never   Substance Use Topics    Alcohol use: Yes      Social History     Substance and Sexual Activity   Drug Use Never       Family History   Problem Relation Age of Onset    Hypertension Father     Cancer Father     Hypertension Mother     Diabetes Father         Immunization History   Administered Date(s) Administered    Influenza Virus Vaccine 2012     Allergies   Allergen Reactions    Oxycodone-Acetaminophen Itching    Codeine Other (See Comments)     Pt states she is not allergic     Prior to Admit Medications:  Current Outpatient Medications   Medication Instructions    levothyroxine (SYNTHROID) 100 mcg, Oral, DAILY BEFORE BREAKFAST    triamterene-hydroCHLOROthiazide (MAXZIDE-25) 37.5-25 MG per tablet 1 tablet, Oral, DAILY Objective:   Patient Vitals for the past 24 hrs:   Temp Pulse Resp BP SpO2   10/19/22 2130 -- 100 23 113/88 --   10/19/22 2116 -- 97 14 114/88 --   10/19/22 1947 -- -- -- -- 97 %   10/19/22 1931 -- (!) 110 16 (!) 158/84 --   10/19/22 1853 -- (!) 115 14 -- --   10/19/22 1840 -- -- 17 (!) 117/94 --   10/19/22 1732 -- (!) 135 16 117/75 98 %   10/19/22 1728 98.4 °F (36.9 °C) (!) 138 18 (!) 128/90 98 %       Oxygen Therapy  SpO2: 97 %    Estimated body mass index is 29.29 kg/m² as calculated from the following:    Height as of this encounter: 5' 1\" (1.549 m). Weight as of this encounter: 155 lb (70.3 kg). Intake/Output Summary (Last 24 hours) at 10/19/2022 2256  Last data filed at 10/19/2022 2143  Gross per 24 hour   Intake 1000 ml   Output --   Net 1000 ml         Physical Exam:    Blood pressure 113/88, pulse 100, temperature 98.4 °F (36.9 °C), temperature source Oral, resp. rate 23, height 5' 1\" (1.549 m), weight 155 lb (70.3 kg), SpO2 97 %. General:    Well nourished. Head:  Normocephalic, atraumatic  Eyes:  Sclerae appear normal.  Pupils equally round. ENT:  Nares appear normal, no drainage. Moist oral mucosa  Neck:  No restricted ROM. Trachea midline   CV:   RRR. No m/r/g. No jugular venous distension. Lungs:   CTAB. No wheezing, rhonchi, or rales. Symmetric expansion. Abdomen: Bowel sounds present. Soft, nontender, nondistended. Extremities: No cyanosis or clubbing. No edema  Skin:     No rashes and normal coloration. Warm and dry. Neuro:  CN II-XII grossly intact. Sensation intact. A&Ox3  Psych:  Normal mood and affect.       I have personally reviewed labs and tests showing:  Recent Labs:  Recent Results (from the past 24 hour(s))   CBC    Collection Time: 10/19/22  6:10 PM   Result Value Ref Range    WBC 8.7 4.3 - 11.1 K/uL    RBC 5.48 (H) 4.05 - 5.2 M/uL    Hemoglobin 15.5 (H) 11.7 - 15.4 g/dL    Hematocrit 48.8 (H) 35.8 - 46.3 %    MCV 89.1 82 - 102 FL    MCH 28.3 26.1 - showing:  XR CHEST PORTABLE    Result Date: 10/19/2022  Portable chest x-ray CLINICAL INDICATION: Shortness of breath FINDINGS: Single AP view the chest compared to a similar exam dated 6/1/2022 show the lungs to be expanded and clear. No pleural effusion or pneumothorax. The cardiac silhouette and mediastinum are unremarkable. Normal portable chest x-ray. Echocardiogram:  No results found for this or any previous visit. Orders Placed This Encounter   Medications    lactated ringers bolus    lactated ringers bolus    aluminum & magnesium hydroxide-simethicone (MAALOX) 200-200-20 MG/5ML suspension 30 mL    sodium chloride flush 0.9 % injection 5-40 mL    sodium chloride flush 0.9 % injection 5-40 mL    0.9 % sodium chloride infusion    enoxaparin (LOVENOX) injection 40 mg     Order Specific Question:   Indication of Use     Answer:   Prophylaxis-DVT/PE    OR Linked Order Group     ondansetron (ZOFRAN-ODT) disintegrating tablet 4 mg     ondansetron (ZOFRAN) injection 4 mg    OR Linked Order Group     acetaminophen (TYLENOL) tablet 650 mg     acetaminophen (TYLENOL) suppository 650 mg    thiamine tablet 100 mg    sodium bicarbonate 150 mEq in dextrose 5 % 1,000 mL infusion    pantoprazole (PROTONIX) tablet 40 mg    levothyroxine (SYNTHROID) tablet 75 mcg         Signed:  Gamaliel Salvador DO, DO    Part of this note may have been written by using a voice dictation software. The note has been proof read but may still contain some grammatical/other typographical errors.

## 2022-10-20 NOTE — PROGRESS NOTES
Hospitalist Progress Note   Admit Date:  10/20/2022 12:53 AM   Name:  Wilber Hutchinson   Age:  47 y.o. Sex:  female  :  1970   MRN:  [id number]   Room:  [facility]/01    Reason(s) for Admission: GARY (acute kidney injury) [facility]) [N17.9]     Hospital Course & Interval History:   Ms. Barbara Beth is a nice 46 y/o female with a h/o obesity s/p sleeve gastrectomy (in [facility], [place]) who was admitted on 10/19 with GARY secondary to poor PO intake due to a 10 day Voodoo fasting. Started on IVFs and admitted. Subjective/24hr Events (10/20/22): She is feeling better today, marginal appetite but has had a few bites of food here and there. Cr improved, urinating w/o issues. Assessment & Plan:   # GARY // high anion gap metabolic acidosis   - Pre-renal due to a 10 day Voodoo fast with hardly any oral intake over the past 5 days. Cr is improved. Con' IVFs, encourage PO. AG normalizing, pH 7.25 on VBG with low bicarb. # HypoK   - Replace PO, Mg normal.    # Hypothyroidism   - Synthroid    # GERD   - PPI    # Obesity    - S/p sleeve gastrectomy this past Spring. Dietician eval to ensure she's on appropriate supplements. Discharge Planning: Home tomorrow. Diet:  ADULT DIET;  Regular; Low Fat/Low Chol/High Fiber/MAYNOR  DVT PPx: Heparin SQ  Code status: Full Code    Hospital Problems             Last Modified POA    * (Principal) GARY (acute kidney injury) ([facility] [address] 10/20/2022 Yes    High anion gap metabolic acidosis  Yes    Starvation ketoacidosis 10/20/2022 Yes    Hypercalcemia 10/20/2022 Yes    Hypothyroidism 10/20/2022 Yes    GERD (gastroesophageal reflux disease) (Chronic) 10/20/2022 Yes    Bariatric surgery status (Chronic) 10/20/2022 Yes    Hypertension 10/20/2022 Yes    Anxiety 10/20/2022 Yes       Objective:   Patient Vitals for the past 24 hrs:   Temp Pulse Resp BP SpO2   10/20/22 1116 98.1 °F (36.7 °C) (!) 115 17 105/80 97 %   10/20/22 0758 97.5 °F (36.4 °C) (!) 105 17 122/82 97 %   10/20/22 0050 97.7 °F (36.5 °C) (!) 101 18 123/88 99 %       Estimated body mass index is 29.02 kg/m² as calculated from the following:    Height as of this encounter: 5' 3\" (1.6 m). Weight as of this encounter: 163 lb 12.8 oz (74.3 kg). Intake/Output Summary (Last 24 hours) at 10/20/2022 1207  Last data filed at 10/20/2022 0140  Gross per 24 hour   Intake 240 ml   Output --   Net 240 ml         Physical Exam:   Blood pressure 105/80, pulse (!) 115, temperature 98.1 °F (36.7 °C), temperature source Oral, resp. rate 17, height 5' 3\" (1.6 m), weight 163 lb 12.8 oz (74.3 kg), SpO2 97 %. General:    Well nourished. No overt distress. Obese. Head:  Normocephalic, atraumatic  Eyes:  Sclerae appear normal. Pupils equally round. ENT:  Nares appear normal, no drainage. Mildly dry oral mucosa. Neck:  No restricted ROM. Trachea midline. CV:   Tachy low 100s. No m/r/g. No jugular venous distension. Lungs:   CTAB. No wheezing, rhonchi, or rales. Respirations even, unlabored. Abdomen: Bowel sounds present. Soft, nontender, nondistended. Extremities: No cyanosis or clubbing. No edema. Skin:     No rashes and normal coloration. Warm and dry. Neuro:  CN II-XII grossly intact. Sensation intact. A&Ox3  Psych:  Normal mood and affect.       I have reviewed ordered lab tests and independently visualized imaging below:    Recent Labs:  Recent Results (from the past 48 hour(s))   CBC    Collection Time: 10/19/22  6:10 PM   Result Value Ref Range    WBC 8.7 4.3 - 11.1 K/uL    RBC 5.48 (H) 4.05 - 5.2 M/uL    Hemoglobin 15.5 (H) 11.7 - 15.4 g/dL    Hematocrit 48.8 (H) 35.8 - 46.3 %    MCV 89.1 82 - 102 FL    MCH 28.3 26.1 - 32.9 PG    MCHC 31.8 31.4 - 35.0 g/dL    RDW 14.6 11.9 - 14.6 %    Platelets 164 (H) 907 - 450 K/uL    MPV 10.7 9.4 - 12.3 FL    nRBC 0.00 0.0 - 0.2 K/uL   Comprehensive Metabolic Panel    Collection Time: 10/19/22  6:10 PM   Result Value Ref Range    Sodium 139 133 - 143 mmol/L    Potassium 5.5 (H) 3.5 - 5.1 mmol/L Chloride 109 101 - 110 mmol/L    CO2 11 (L) 21 - 32 mmol/L    Anion Gap 19 (H) 2 - 11 mmol/L    Glucose 84 65 - 100 mg/dL    BUN 21 6 - 23 MG/DL    Creatinine 1.80 (H) 0.6 - 1.0 MG/DL    Est, Glom Filt Rate 33 (L) >60 ml/min/1.73m2    Calcium 11.5 (H) 8.3 - 10.4 MG/DL    Total Bilirubin 0.8 0.2 - 1.1 MG/DL    ALT 20 12 - 65 U/L    AST 43 (H) 15 - 37 U/L    Alk Phosphatase 122 50 - 136 U/L    Total Protein 9.0 (H) 6.3 - 8.2 g/dL    Albumin 4.3 3.5 - 5.0 g/dL    Globulin 4.7 (H) 2.8 - 4.5 g/dL    Albumin/Globulin Ratio 0.9 0.4 - 1.6     TSH with Reflex    Collection Time: 10/19/22  6:10 PM   Result Value Ref Range    TSH w Free Thyroid if Abnormal 3.15 0.358 - 3.740 UIU/ML   D-Dimer, Quantitative    Collection Time: 10/19/22  6:10 PM   Result Value Ref Range    D-Dimer, Quant >20.00 (H) <0.56 ug/ml(FEU)   Magnesium    Collection Time: 10/19/22  6:10 PM   Result Value Ref Range    Magnesium 2.6 (H) 1.8 - 2.4 mg/dL   Alcohol    Collection Time: 10/19/22  6:10 PM   Result Value Ref Range    Ethanol Lvl <3 MG/DL   Pregnancy, Urine    Collection Time: 10/19/22  9:09 PM   Result Value Ref Range    HCG(Urine) Pregnancy Test Negative NEG     Basic Metabolic Panel    Collection Time: 10/19/22  9:09 PM   Result Value Ref Range    Sodium 141 133 - 143 mmol/L    Potassium 4.2 3.5 - 5.1 mmol/L    Chloride 111 (H) 101 - 110 mmol/L    CO2 12 (L) 21 - 32 mmol/L    Anion Gap 18 (H) 2 - 11 mmol/L    Glucose 85 65 - 100 mg/dL    BUN 20 6 - 23 MG/DL    Creatinine 1.80 (H) 0.6 - 1.0 MG/DL    Est, Glom Filt Rate 33 (L) >60 ml/min/1.73m2    Calcium 11.1 (H) 8.3 - 10.4 MG/DL   Venous Blood Gas, POC    Collection Time: 10/19/22 11:23 PM   Result Value Ref Range    DEVICE ROOM AIR      PH, VENOUS (POC) 7.25 (L) 7.32 - 7.42      PCO2, [place], POC 32.6 (L) 41 - 51 MMHG    PO2, VENOUS (POC) 29 mmHg    HCO3, Venous 14.4 (L) 23 - 28 MMOL/L    SO2, VENOUS (POC) 47.4 (L) 65 - 88 %    Base Deficit, Venous 11.6 mmol/L    POC Bonifacio's Test NOT APPLICABLE Site RIGHT RADIAL      Specimen type: VENOUS BLOOD      Performed by: Kiehsa     Critical Value Read Back BINU    Lactic Acid    Collection Time: 10/19/22 11:35 PM   Result Value Ref Range    Lactic Acid, Plasma 1.7 0.4 - 2.0 MMOL/L   Lactic Acid    Collection Time: 10/20/22  2:59 AM   Result Value Ref Range    Lactic Acid, Plasma 0.9 0.4 - 2.0 MMOL/L   PTH, Intact    Collection Time: 10/20/22  2:59 AM   Result Value Ref Range    Calcium 10.0 8.3 - 10.4 MG/DL    Pth Intact 17.4 (L) 18.5 - 88.0 pg/mL   Vitamin D 25 Hydroxy    Collection Time: 10/20/22  2:59 AM   Result Value Ref Range    Vit D, 25-Hydroxy 32.7 30.0 - 100.0 ng/mL   CBC with Auto Differential    Collection Time: 10/20/22  2:59 AM   Result Value Ref Range    WBC 8.7 4.3 - 11.1 K/uL    RBC 4.45 4.05 - 5.2 M/uL    Hemoglobin 12.4 11.7 - 15.4 g/dL    Hematocrit 37.2 35.8 - 46.3 %    MCV 83.6 82.0 - 102.0 FL    MCH 27.9 26.1 - 32.9 PG    MCHC 33.3 31.4 - 35.0 g/dL    RDW 14.2 11.9 - 14.6 %    Platelets 462 567 - 839 K/uL    MPV 10.2 9.4 - 12.3 FL    nRBC 0.00 0.0 - 0.2 K/uL    Differential Type AUTOMATED      Seg Neutrophils 50 43 - 78 %    Lymphocytes 40 13 - 44 %    Monocytes 7 4.0 - 12.0 %    Eosinophils % 2 0.5 - 7.8 %    Basophils 1 0.0 - 2.0 %    Immature Granulocytes 0 0.0 - 5.0 %    Segs Absolute 4.3 1.7 - 8.2 K/UL    Absolute Lymph # 3.5 0.5 - 4.6 K/UL    Absolute Mono # 0.6 0.1 - 1.3 K/UL    Absolute Eos # 0.2 0.0 - 0.8 K/UL    Basophils Absolute 0.1 0.0 - 0.2 K/UL    Absolute Immature Granulocyte 0.0 0.0 - 0.5 K/UL   Magnesium    Collection Time: 10/20/22  2:59 AM   Result Value Ref Range    Magnesium 2.0 1.8 - 2.4 mg/dL   Comprehensive Metabolic Panel    Collection Time: 10/20/22  2:59 AM   Result Value Ref Range    Sodium 139 133 - 143 mmol/L    Potassium 3.4 (L) 3.5 - 5.1 mmol/L    Chloride 108 101 - 110 mmol/L    CO2 16 (L) 21 - 32 mmol/L    Anion Gap 15 (H) 2 - 11 mmol/L    Glucose 138 (H) 65 - 100 mg/dL    BUN 18 6 - 23 MG/DL    Creatinine 1.43 (H) 0.6 - 1.0 MG/DL    Est, Glom Filt Rate 44 (L) >60 ml/min/1.73m2    Calcium 10.0 8.3 - 10.4 MG/DL    Total Bilirubin 0.6 0.2 - 1.1 MG/DL    ALT 15 12 - 65 U/L    AST 15 15 - 37 U/L    Alk Phosphatase 100 50 - 136 U/L    Total Protein 7.4 6.3 - 8.2 g/dL    Albumin 3.3 (L) 3.5 - 5.0 g/dL    Globulin 4.1 2.8 - 4.5 g/dL    Albumin/Globulin Ratio 0.8 0.4 - 1.6           Other Studies:  XR CHEST PORTABLE    Result Date: 10/19/2022  Portable chest x-ray CLINICAL INDICATION: Shortness of breath FINDINGS: Single AP view the chest compared to a similar exam dated 6/1/2022 show the lungs to be expanded and clear. No pleural effusion or pneumothorax. The cardiac silhouette and mediastinum are unremarkable. Normal portable chest x-ray.       Current Meds:  Current Facility-Administered Medications   Medication Dose Route Frequency    sodium chloride flush 0.9 % injection 5-40 mL  5-40 mL IntraVENous 2 times per day    sodium chloride flush 0.9 % injection 5-40 mL  5-40 mL IntraVENous PRN    0.9 % sodium chloride infusion   IntraVENous PRN    ondansetron (ZOFRAN-ODT) disintegrating tablet 4 mg  4 mg Oral Q8H PRN    Or    ondansetron (ZOFRAN) injection 4 mg  4 mg IntraVENous Q6H PRN    acetaminophen (TYLENOL) tablet 650 mg  650 mg Oral Q6H PRN    Or    acetaminophen (TYLENOL) suppository 650 mg  650 mg Rectal Q6H PRN    thiamine tablet 100 mg  100 mg Oral Daily    sodium bicarbonate 150 mEq in dextrose 5 % 1,000 mL infusion   IntraVENous Continuous    pantoprazole (PROTONIX) tablet 40 mg  40 mg Oral QAM AC    levothyroxine (SYNTHROID) tablet 75 mcg  75 mcg Oral Daily    heparin (porcine) injection 5,000 Units  5,000 Units SubCUTAneous 3 times per day    simethicone (MYLICON) chewable tablet 80 mg  80 mg Oral Q6H PRN    calcium carbonate (TUMS) chewable tablet 500 mg  500 mg Oral TID PRN       Signed:  Wayne Villa MD    Part of this note may have been written by using a voice dictation software. The note has been proof read but may still contain some grammatical/other typographical errors.

## 2022-10-20 NOTE — DISCHARGE INSTR - DIET
Discharge Nutrition Plan: Recommend an appointment with nutrition therapy in outpatient setting. They can help you tailor your meal plan to your lifestyle and health concerns.    Contact the [facility] Appointment line at [phone] for more information

## 2022-10-20 NOTE — CARE COORDINATION
10/20/22 1603   Service Assessment   Patient Orientation Alert and Oriented;Person;Place;Situation;Self   Cognition Alert   History Provided By Patient   Primary Caregiver Self   Support Systems Spouse/Significant Other   Patient's Healthcare Decision Maker is: Legal Next of Kin   PCP Verified by CM No  (referral sent for new PCP per patient request)   Prior Functional Level Independent in ADLs/IADLs   Current Functional Level Independent in ADLs/IADLs   Can patient return to prior living arrangement Yes   Ability to make needs known: Good   Family able to assist with home care needs: Yes   Would you like for me to discuss the discharge plan with any other family members/significant others, and if so, who? No   Financial Resources Other (Comment)  ([address])   Social/Functional History   Lives With Spouse   Type of [address] Help From Family   ADL Assistance Independent   Ambulation Assistance Independent   Transfer Assistance Independent   Active  Yes  (she can drive says spouse does the driving)   Mode of Transportation Car   Discharge Planning   Type of [address] Prior To Admission None   Potential Assistance Needed N/A   DME Ordered? No   Potential Assistance Purchasing Medications No   Type of Home Care Services None   [facility]   Transition of Care Consult (CM Consult) Discharge Planning   Services At/After Discharge None   Confirm Follow Up Transport Family   Condition of Participation: Discharge Planning   The Plan for Transition of Care is related to the following treatment goals: Home with spouse   Interdisciplinary team meeting with attending, CM, nursing, PT and nutritional services for plan of care. CM met with patient for d/c planning.  Patient alert and oriented x 3, independent of ADL's and lives with spouse She requires no DME and confirmed demographics and contact information. She has [employer] and obtains medications at [facility]. Referral to PCP line per patient request. Patient plans to d/c home with spouse Voices no Concerns or needs for d/c.

## 2022-10-20 NOTE — PROGRESS NOTES
ACUTE OCCUPATIONAL THERAPY GOALS:   (Developed with and agreed upon by patient and/or caregiver.)  1. Patient will perform grooming with supervision. 2. Patient will perform upper body dressing with supervision. 3. Patient will perform lower body dressing with supervision. 4. Patient will perform bathing with supervision. 5. Patient will perform toileting and toilet transfer with supervision. 6. Patient will perform ADL functional mobility and tranfers in room with supervision. 7. Patient/family to demonstrate knowledge of home safety and DME recommendations. Goals to be achieved in 7 days. OCCUPATIONAL THERAPY Initial Assessment, Daily Note, and PM       OT Visit Days: 1  Acknowledge Orders  Time  OT Charge Capture  Rehab Caseload Tracker      Pretty Yeny Garcia is a 47 y.o. female   PRIMARY DIAGNOSIS: GARY (acute kidney injury) ([facility] [address]  GARY (acute kidney injury) ([facility] [address] [N17.9]       Reason for Referral: Generalized Muscle Weakness (M62.81)  Inpatient: Payor:  EAST / Plan:  EAST  / Product Type: *No Product type* /     ASSESSMENT:     REHAB RECOMMENDATIONS:   Recommendation to date pending progress:  Setting:  No further skilled therapy after discharge from hospital    Equipment:    None     ASSESSMENT:  Ms. Yeny Garcia was admitted with above diagnosis from the ED after working on a cleanse diet and not eating for the last 5 days. Pt is noted to have decreased activity tolerance and decreased standing balance. Pt would benefit from another session of OT to ensure safety with self care and functional mobility. Pt seen in room with daughter and grandchildren present. Pt up with CGA. Pt setup in recliner with lunch and provided with coffee. OT will follow.        [employer] AM-PAC 6 Clicks Daily Activity Inpatient Short Form:    AM-PAC Daily Activity Inpatient   How much help for putting on and taking off regular lower body clothing?: A Little  How much help for Bathing?: A Little  How much help for Toileting?: A Little  How much help for putting on and taking off regular upper body clothing?: A Little  How much help for taking care of personal grooming?: A Little  How much help for eating meals?: None  AM-PAC Inpatient Daily Activity Raw Score: 19  AM-PAC Inpatient ADL T-Scale Score : 40.22  ADL Inpatient CMS 0-100% Score: 42.8  ADL Inpatient CMS G-Code Modifier : CK           SUBJECTIVE:     Ms. Steve Chahal states, she is doing better today     Social/Functional Lives With: Spouse  Type of Home: House  Home Layout: Two level    OBJECTIVE:     LINES / Alric Jurist / Dewane Apple: IV    RESTRICTIONS/PRECAUTIONS:       PAIN: VITALS / O2:   Pre Treatment:          Post Treatment: none       Vitals          Oxygen            GROSS EVALUATION: INTACT IMPAIRED   (See Comments)   UE AROM [x] []   UE PROM [x] []   Strength [x]       Posture / Balance []  Slightly decreased standing balance.     Sensation [x]     Coordination [x]       Tone [x]       Edema []    Activity Tolerance [x]       Hand Dominance R [] L []      COGNITION/  PERCEPTION: INTACT IMPAIRED   (See Comments)   Orientation [x]     Vision [x]     Hearing [x]     Cognition  [x]     Perception [x]       MOBILITY: I Mod I S SBA CGA Min Mod Max Total  NT x2 Comments:   Bed Mobility    Rolling [] [] [] [] [] [] [] [] [] [] []    Supine to Sit [] [] [] [] [] [] [] [] [] [] []    Scooting [] [] [] [] [] [] [] [] [] [] []    Sit to Supine [] [] [] [] [] [] [] [] [] [] []    Transfers    Sit to Stand [] [] [] [] [x] [] [] [] [] [] []    Bed to Chair [] [] [] [] [x] [] [] [] [] [] []    Stand to Sit [] [] [] [] [x] [] [] [] [] [] []    Tub/Shower [] [] [] [] [x] [] [] [] [] [] []     Toilet [] [] [] [] [x] [] [] [] [] [] []      [] [] [] [] [] [] [] [] [] [] []    I=Independent, Mod I=Modified Independent, S=Supervision/Setup, SBA=Standby Assistance, CGA=Contact Guard Assistance, Min=Minimal Assistance, Mod=Moderate Assistance, Max=Maximal Assistance, Total=Total Assistance, NT=Not Tested    ACTIVITIES OF DAILY LIVING: I Mod I S SBA CGA Min Mod Max Total NT Comments   BASIC ADLs:              Upper Body Bathing  [] [] [] [x] [] [] [] [] [] []    Lower Body Bathing [] [] [] [] [x] [] [] [] [] []    Toileting [] [] [] [] [x] [] [] [] [] []    Upper Body Dressing [] [] [] [] [x] [] [] [] [] []    Lower Body Dressing [] [] [] [] [x] [] [] [] [] []    Feeding [x] [] [] [] [] [] [] [] [] []    Grooming [] [] [] [] [x] [] [] [] [] []    Personal Device Care [] [] [] [] [] [] [] [] [] []    Functional Mobility [] [] [] [] [x] [] [] [] [] []    I=Independent, Mod I=Modified Independent, S=Supervision/Setup, SBA=Standby Assistance, CGA=Contact Guard Assistance, Min=Minimal Assistance, Mod=Moderate Assistance, Max=Maximal Assistance, Total=Total Assistance, NT=Not Tested    PLAN:   FREQUENCY/DURATION   OT Plan of Care: 2 times/week for duration of hospital stay or until stated goals are met, whichever comes first.    PROBLEM LIST:   (Skilled intervention is medically necessary to address:)  Decreased ADL/Functional Activities  Decreased Balance   INTERVENTIONS PLANNED:  (Benefits and precautions of occupational therapy have been discussed with the patient.)  Self Care Training  Education         TREATMENT:     EVALUATION: LOW COMPLEXITY: (Untimed Charge)    TREATMENT:   Self Care: (15 min): Procedure(s) (per grid) utilized to improve and/or restore self-care/home management as related to  functional mobility . Required minimal verbal, manual, and   cueing to facilitate activities of daily living skills and compensatory activities. TREATMENT GRID:  N/A    AFTER TREATMENT PRECAUTIONS: Bed/Chair Locked, Call light within reach, Chair, RN notified, and Visitors at bedside    INTERDISCIPLINARY COLLABORATION:  RN/ PCT, PT/ PTA, and OT/ ORTIZ    EDUCATION:  Education Given To: Patient; Family  Education Provided: Role of Therapy;Plan of Care;ADL Adaptive Strategies; Fall Prevention Strategies  Education Outcome: Verbalized understanding;Demonstrated understanding    TOTAL TREATMENT DURATION AND TIME:  Time In: 1150  Time Out: 6955 [address]  Minutes: 79[facility], OT

## 2022-10-20 NOTE — PROGRESS NOTES
ACUTE PHYSICAL THERAPY GOALS:   (Developed with and agreed upon by patient and/or caregiver.)  STG:  (1.)Ms. Con Appiah will move from supine to sit and sit to supine  with SUPERVISION within 7 treatment day(s). (2.)Ms. Con Appiah will transfer from bed to chair and chair to bed with SUPERVISION using the least restrictive device within 7 treatment day(s). (3.)Ms. Con Appiah will ambulate with SUPERVISION for 500 feet with the least restrictive device within 7 treatment day(s). (4)Ms South Td will go up a flight of steps CGA in 7 days. ________________________________________________________________________________________________      PHYSICAL THERAPY Initial Assessment and AM  (Link to Caseload Tracking: PT Visit Days : 1  Acknowledge Orders  Time In/Out  PT Charge Capture  Rehab Caseload Tracker    Rhonda Appiah is a 47 y.o. female   PRIMARY DIAGNOSIS: GARY (acute kidney injury) ([facility] [address]  GARY (acute kidney injury) ([facility] [address] [N17.9]       Reason for Referral: Other abnormalities of gait and mobility (R26.89)  Inpatient: Payor: SHRUTHI EAST / Plan:  EAST  / Product Type: *No Product type* /     ASSESSMENT:     REHAB RECOMMENDATIONS:   Recommendation to date pending progress:  Setting:  No further skilled therapy after discharge from hospital    Equipment:    None     ASSESSMENT:  Ms. Con Appiah presents with general weakness and decreased functional mobility with above diagnosis. She is normally independent and active and lives with her . Her bedroom is upstairs. This am, she reports she is better but still weak. She ambulated to restroom and back to bedside chair CGA/MIN assist.  She used restroom on her own. She is unsteady on her feet. Will follow. Anticipate her going home without any need for HHPT.      [facility] [address] [address] AM-PAC 6 Clicks Basic Mobility Inpatient Short Form  AM-PAC Mobility Inpatient   How much difficulty turning over in bed?: A Little  How much difficulty sitting down on / standing up from a chair with arms?: A Little  How much difficulty moving from lying on back to sitting on side of bed?: A Little  How much help from another person moving to and from a bed to a chair?: A Little  How much help from another person needed to walk in hospital room?: A Little  How much help from another person for climbing 3-5 steps with a railing?: A Little  AM-PAC Inpatient Mobility Raw Score : 18  AM-PAC Inpatient T-Scale Score : 43.63  Mobility Inpatient CMS 0-100% Score: 46.58  Mobility Inpatient CMS G-Code Modifier : CK    SUBJECTIVE:   Ms. [facility] states, \"better\"     Social/Functional Lives With: Spouse  Type of Home: House  Home Layout: Two level    OBJECTIVE:     PAIN: Gagandeep Patience / O2: Cj Chavez / Jeannie Callaway / Rose Roles:   Pre Treatment:          Post Treatment: none Vitals        Oxygen      IV    RESTRICTIONS/PRECAUTIONS:                    GROSS EVALUATION: Intact Impaired (Comments):   AROM [x]  LE's   PROM []    Strength [x]  LE's   Balance [] Sitting - Static: Good  Sitting - Dynamic: Good  Standing - Static: Good  Standing - Dynamic: Fair   Posture [] Rounded Shoulders   Sensation [x]     Coordination []      Tone []     Edema []    Activity Tolerance []      []      COGNITION/  PERCEPTION: Intact Impaired (Comments):   Orientation [x]     Vision [x]     Hearing [x]     Cognition  [x]       MOBILITY: I Mod I S SBA CGA Min Mod Max Total  NT x2 Comments:   Bed Mobility    Rolling [] [] [] [] [] [] [] [] [] [] []    Supine to Sit [] [] [] [] [] [x] [] [] [] [] []    Scooting [] [] [] [] [] [] [] [] [] [] []    Sit to Supine [] [] [] [] [] [] [] [] [] [] []    Transfers    Sit to Stand [] [] [] [] [x] [] [] [] [] [] []    Bed to Chair [] [] [] [] [x] [x] [] [] [] [] []    Stand to Sit [] [] [] [] [x] [] [] [] [] [] []     [] [] [] [] [] [] [] [] [] [] []    I=Independent, Mod I=Modified Independent, S=Supervision, SBA=Standby Assistance, CGA=Contact Guard Assistance,   Min=Minimal Assistance, Mod=Moderate Assistance, Max=Maximal Assistance, Total=Total Assistance, NT=Not Tested    GAIT: I Mod I S SBA CGA Min Mod Max Total  NT x2 Comments:   Level of Assistance [] [] [] [] [x] [x] [] [] [] [] []    Distance 15 (x 2) feet    DME None    Gait Quality Decreased sd , Decreased step length, and Path deviations     Weightbearing Status      Stairs      I=Independent, Mod I=Modified Independent, S=Supervision, SBA=Standby Assistance, CGA=Contact Guard Assistance,   Min=Minimal Assistance, Mod=Moderate Assistance, Max=Maximal Assistance, Total=Total Assistance, NT=Not Tested    PLAN:   FREQUENCY AND DURATION: Daily for duration of hospital stay or until stated goals are met, whichever comes first.    THERAPY PROGNOSIS: Good    PROBLEM LIST:   (Skilled intervention is medically necessary to address:)  Decreased Activity Tolerance  Decreased AROM/PROM  Decreased Balance  Decreased Coordination  Decreased Gait Ability  Decreased Safety Awareness  Decreased Strength  Decreased Transfer Abilities INTERVENTIONS PLANNED:   (Benefits and precautions of physical therapy have been discussed with the patient.)  Therapeutic Activity  Therapeutic Exercise/HEP  Gait Training  Education       TREATMENT:   EVALUATION: LOW COMPLEXITY: (Untimed Charge)    TREATMENT:   Therapeutic Activity (15 Minutes): Therapeutic activity included Supine to Sit, Lateral Scooting, Transfer Training, Ambulation on level ground, Sitting balance , and Standing balance to improve functional Activity tolerance, Balance, Coordination, Mobility, Strength, and ROM.     TREATMENT GRID:  N/A    AFTER TREATMENT PRECAUTIONS: Bed/Chair Locked, Call light within reach, Chair, Needs within reach, and RN notified    INTERDISCIPLINARY COLLABORATION:  RN/ PCT    EDUCATION: Education Given To: Patient  Education Provided: Role of Therapy  Education Method: Demonstration;Verbal  Education Outcome: Verbalized understanding    TIME IN/OUT:  Time In: 1020  Time Out: [address]  Minutes: DANUTA Nugent

## 2022-10-20 NOTE — CONSULTS
Comprehensive Nutrition Assessment    Type and Reason for Visit: Initial, Consult, Positive Nutrition Screen  Malnutrition Screening Tool: Malnutrition Screen  Have you recently lost weight without trying?: Unsure of amount of weight loss (2 points) (Pt states weight loss is from weight loss surgery)  Have you been eating poorly because of a decreased appetite?: No (0 points)  Malnutrition Screening Tool Score: 2 and Poor Intake/Appetite 5 or More Days (Hospitalists)    Nutrition Recommendations/Plan:   Food and/or Nutrient Delivery: Continue Current Diet, Vitamin Supplement, Mineral Supplement. Add no cocnentrated sweets to current diet order. dd bariatric MVI with minerals-1 chew bid. Instructed pt to resume Barimelts multivitamin as 1 chew bid and Barimelt calcium as 1 chew bid at home. Medical food supplement therapy:   Instructed ptt to resume Fair life protein drink at least once per day depending on intake of food sources of protein   Nutrition Education/Counseling: Education initiated. Reviewed fluid and protein goals. Handouts provided: Fluids post bariatric surgery, recommended protein sources post bariatric surgery, bariatric regular diet guidelines to include overall guidelines, 8 day sample menu, JAVIER for bariatric cookbook. Pt is to review and [place] recipes she would like printed. Reached out to bariatric/SWL RD Dago Amor and she will bring bariatric binder and review with pt either today or tomorrow. Coordination of Nutrition Care: Continue to monitor while inpatient, Interdisciplinary Rounds        Malnutrition Assessment:  Malnutrition Status: At risk for malnutrition (Comment) (VSG with intended weight loss but inadeqaute protien and fluid intake)    Nutrition Assessment:  Nutrition History: 10/20: Pt using Barimelts multivitamin and Barimelts calcium with vitamin D as supplements since her VSG. She was taking 2 per day intially but had dropped to 1 per day for no particular reason.  Her eldon brings her the foods she request. She typically has chicken mini's from [employer] for breakfast. She eats only 1 mini. Other meal is something like chicken parmesan or ribs from [employer]. She says she doesn't even know why she keeps having her daughter to bring her meals because she eats minimal and end up giving the remainder to her grand kids to finish. She says she tolerates the ribs better ( less vomiting) than she does most other foods. She sometimes will be able to eat an entire slab, but it take her the whole day as she eats tiny amounts every few hours. On rare occasions she might prepare a fruit and spinach smoothie in her Vitamix. She does not add protein powder. She tired Premiere protein and did not like the taste. She found that she likes the Kastja protein drink better. She drank it at first but has not been lately. In general since the VSG, she has not been able to eat much as she says she doesn't keep it down/has vomiting. Her pre-op weight was ~220. She does not know how much weight she has lost or what her current weight is. She thinks she is continuing to lose weight. She says she does a 3 day fast often but this time she did a 10 day fast. The fast she used was only fresh vegetables and fresh water. She stopped eating the vegetables after 5 days because she did not want them and thought she could complete the fast with water only. Nutrition Background:   H/O: MO, S/P sleeve gastrectomy (in [facility] 2022),hypothyroid, GERD, DJD, HTN, Vitamin D deficiency  P/W fatigue after start of [employer] r/t fast 10 days ago and nothing in 5 days  Admitted with hypotension, GARY, hypercalcemia, metabolic acidosis suspected to starvation ketosis  Nutrition Interval:  Pt seen in company of daughter and her 5 minor grand children. Patient reports this admission has been a \"wake up call\" and she made a promise yto God that she would do better.  She wants recommendations on what to eat and what vitamin and inerla supplements to take. Meds; Tums, protonix, mylicon, thiamin, IVF D5 with Bicarb, J supplement today         Current Nutrition Therapies:  ADULT DIET; Regular; Low Fat/Low Chol/High Fiber/MAYNOR    Current Intake:   Average Meal Intake: 51-75% (2 recorded meals)        Anthropometric Measures:  Height: 5' 3\" (160 cm)  Current Body Wt: 163 lb 12.8 oz (74.3 kg) (10/20), Weight source: Not Specified  Weight History Weight Weight Weight Method   10/20/2022 163 lbs 13 oz 74.3 kg -   10/19/2022 155 lbs 70.3 kg Bed scale   BMI: 29  Admission Body Weight: 154 lb 15.7 oz (70.3 kg) (bed scale)  Ideal Body Weight (Kg) (Calculated): 52 kg (115 lbs),    Usual Body Wt:  ,   8/14/2021 210 lbs 95.3 kg -   8/13/2021 210 lbs 95.3 kg -   7/30/2021 218 lbs 6 oz 99.1 kg -   7/16/2021 218 lbs 8 oz 99.1 kg    Percent weight change:  22.4% but intended post VSG       Edema: No data recorded  BMI Category Overweight (BMI 25.0-29. 9)  Estimated Daily Nutrient Needs:  . Objective measure of EEN difficult to assess 2nd rapid wt loss. Total energy requirements based on predictive and objective measures of the SWL pt inaccurate and are not likely to be met for up to 24 months postoperative. EPR: 60-80gm/dof HBV pro to minimize losses of LBM   Fluid (ml/day):   ( )64 + ounces/d    Nutrition Diagnosis:   Limited adherence to nutrition-related recommendations related to altered GI structure, early satiety as evidenced by vomiting, poor intake prior to admission (diet recall as above,kcal intake not intended to meet need, portien intake inadequate post VSG)    Goals:       Active Goal: Teach back diet education, PO intake 50% or greater, by next RD assessment       Nutrition Monitoring and Evaluation:   Behavioral-Environmental Outcomes: Beliefs and Attitutes, Knowledge or Skill, Readiness for Change  Food/Nutrient Intake Outcomes: Food and Nutrient Intake  Physical Signs/Symptoms Outcomes: Weight    Discharge Planning:    Recommend pursue outpatient nutrition counseling    Wendy Wu, JORDAN,LD, [address]  [phone]

## 2022-10-20 NOTE — H&P
See H&P from [facility]. No interval changes. Transfering to [facility] d/t no beds.

## 2022-10-21 VITALS
DIASTOLIC BLOOD PRESSURE: 63 MMHG | RESPIRATION RATE: 19 BRPM | HEIGHT: 63 IN | BODY MASS INDEX: 29.02 KG/M2 | WEIGHT: 163.8 LBS | OXYGEN SATURATION: 100 % | HEART RATE: 75 BPM | TEMPERATURE: 97.5 F | SYSTOLIC BLOOD PRESSURE: 118 MMHG

## 2022-10-21 PROBLEM — T73.0XXA STARVATION KETOACIDOSIS: Status: RESOLVED | Noted: 2022-10-19 | Resolved: 2022-10-21

## 2022-10-21 PROBLEM — E87.29 HIGH ANION GAP METABOLIC ACIDOSIS: Status: RESOLVED | Noted: 2022-10-19 | Resolved: 2022-10-21

## 2022-10-21 PROBLEM — E83.52 HYPERCALCEMIA: Status: RESOLVED | Noted: 2022-10-19 | Resolved: 2022-10-21

## 2022-10-21 PROBLEM — N17.9 AKI (ACUTE KIDNEY INJURY) (HCC): Status: RESOLVED | Noted: 2022-10-19 | Resolved: 2022-10-21

## 2022-10-21 PROBLEM — E87.29 STARVATION KETOACIDOSIS: Status: RESOLVED | Noted: 2022-10-19 | Resolved: 2022-10-21

## 2022-10-21 LAB
ANION GAP SERPL CALC-SCNC: 7 MMOL/L (ref 2–11)
BUN SERPL-MCNC: 13 MG/DL (ref 6–23)
CALCIUM SERPL-MCNC: 9.8 MG/DL (ref 8.3–10.4)
CHLORIDE SERPL-SCNC: 100 MMOL/L (ref 101–110)
CO2 SERPL-SCNC: 33 MMOL/L (ref 21–32)
CREAT SERPL-MCNC: 1.15 MG/DL (ref 0.6–1)
GLUCOSE SERPL-MCNC: 121 MG/DL (ref 65–100)
MAGNESIUM SERPL-MCNC: 1.8 MG/DL (ref 1.8–2.4)
POTASSIUM SERPL-SCNC: 2.7 MMOL/L (ref 3.5–5.1)
SODIUM SERPL-SCNC: 140 MMOL/L (ref 133–143)

## 2022-10-21 PROCEDURE — 6360000002 HC RX W HCPCS: Performed by: FAMILY MEDICINE

## 2022-10-21 PROCEDURE — 36415 COLL VENOUS BLD VENIPUNCTURE: CPT

## 2022-10-21 PROCEDURE — 6370000000 HC RX 637 (ALT 250 FOR IP): Performed by: FAMILY MEDICINE

## 2022-10-21 PROCEDURE — 6370000000 HC RX 637 (ALT 250 FOR IP): Performed by: INTERNAL MEDICINE

## 2022-10-21 PROCEDURE — 83735 ASSAY OF MAGNESIUM: CPT

## 2022-10-21 PROCEDURE — 80048 BASIC METABOLIC PNL TOTAL CA: CPT

## 2022-10-21 RX ORDER — POTASSIUM CHLORIDE 20 MEQ/1
40 TABLET, EXTENDED RELEASE ORAL ONCE
Status: DISCONTINUED | OUTPATIENT
Start: 2022-10-21 | End: 2022-10-21 | Stop reason: HOSPADM

## 2022-10-21 RX ORDER — TRAMADOL HYDROCHLORIDE 50 MG/1
50 TABLET ORAL ONCE
Status: COMPLETED | OUTPATIENT
Start: 2022-10-21 | End: 2022-10-21

## 2022-10-21 RX ADMIN — PANTOPRAZOLE SODIUM 40 MG: 40 TABLET, DELAYED RELEASE ORAL at 06:39

## 2022-10-21 RX ADMIN — Medication 1 TABLET: at 09:28

## 2022-10-21 RX ADMIN — TRAMADOL HYDROCHLORIDE 50 MG: 50 TABLET ORAL at 04:46

## 2022-10-21 RX ADMIN — Medication 100 MG: at 09:28

## 2022-10-21 RX ADMIN — LEVOTHYROXINE SODIUM 75 MCG: 0.07 TABLET ORAL at 06:39

## 2022-10-21 RX ADMIN — HEPARIN SODIUM 5000 UNITS: 5000 INJECTION INTRAVENOUS; SUBCUTANEOUS at 06:40

## 2022-10-21 ASSESSMENT — PAIN DESCRIPTION - ORIENTATION: ORIENTATION: MID

## 2022-10-21 ASSESSMENT — PAIN DESCRIPTION - LOCATION: LOCATION: ABDOMEN

## 2022-10-21 ASSESSMENT — PAIN DESCRIPTION - DESCRIPTORS: DESCRIPTORS: ACHING

## 2022-10-21 ASSESSMENT — PAIN SCALES - GENERAL
PAINLEVEL_OUTOF10: 0
PAINLEVEL_OUTOF10: 6

## 2022-10-21 NOTE — PROGRESS NOTES
Christopher White, MS, RD, LD  Surgical Weight Loss Dietitian  [facility], [address] [place] [address]  Kimberly Smith  Phone [phone]   Fax [phone]    [facility] NUTRITION REASSESSMENT  Anthropometrics:    Ht: 53, wt: 164#, 116% IBW, 29.02 BMI    Pt is 5 months post-op VSG (May 2022 in [facility]). Pt getting ~30-40g protein/d and ~64+oz fluid/d. Pt is not eating at least 3x/d. She is not waiting 30 minutes after eating to drink liquids. She is drinking water, ginger ale. Pt was exercising 7d/w for 15+ mins via weights and body weight exercises before becoming too fatigued before hospital admission secondary to starvation ketosis. Pt is not taking MVI and Ca supplements as recommended. Pt reports regular dumping and vomiting with overeating. Pt states \"I do believe I have lost my muscles. \"    Diet Recall:  Pt states that she regularly eats foods from restaurants, and that she regularly fasts with fresh veggies and water only. She admits to eating foods high in fat, sugar, and drinking with meals. Nutrition Diagnosis:  Overweight R/T excessive energy intake as evidenced by BMI = 29.02 and 116 % IBW. Inadequate protein intake R/T difficulty consuming appropriate amount of dietary protein as evidenced by pt s/p VSG and reports consuming 30-40g Pro/d. Food, nutrition, and nutrition-related knowledge deficit R/T learning/applying information as evidenced by inability to recall nutrition recommendations. Limited adherence to nutrition-related recommendations R/T food choices consistent with recommendations as evidenced by s/p VSG and consuming foods high in fat, starches, sugars, and reported experiencing regular episodes of dumping syndrome. Intervention:   Evaluated diet recall and identified modifications.   Encouraged lean protein focus  Encouraged incorporation of non-starchy vegetables  Encouraged practice with meal priority; protein first followed by non-starchy vegetables and complex carbohydrates  Encouraged increased fluid intake  Encouraged avoidance of sugary/caffeinated/carbonated beverages  Educated pt on mindful eating behaviors and encouraged practice  Educated pt on nutrition supplements and encouraged intake according to bariatric recommendations. Encouraged intake of protein supplements as needed to reach daily goal of 60-80g PRO. Encouraged taking bariatric MVI with iron and Calcium supplements to meet nutrient needs and avoid future deficiencies. Discussed meal/food ideas on regular bariatric diet. Encouraged healthy choices from restaurant foods and meals at home - focusing on lean protein at all meals and snacks, followed by non-starchy vegetables, and complex carbs as tolerated. Encouraged continued and increased activity. Encouraged incorporation of cardio/resistance training. Pt goal of cardio and weight resistance exercises 7x/week for 30+ mins. Discussed need for pt to seek bariatric care for follow-up and maintenance after released from hospital.    Monitoring and Evaluation:  Enedina Francisco to follow while pt is admitted, and consult as needed.   F/U per MD Jesus Manuel Howard, MS, RD, LD

## 2022-10-21 NOTE — DISCHARGE SUMMARY
Hospitalist Discharge Summary   Admit Date:  10/20/2022 12:53 AM   DC Note date: 10/21/2022  Name:  Victor Manuel Osborne   Age:  47 y.o. Sex:  female  :  1970   MRN:  [id number]   Room:  [facility]  PCP:  Sparkle Mcguire MD    Presenting Complaint: No chief complaint on file. Initial Admission Diagnosis: GARY (acute kidney injury) ([facility] [address] [N17.9]     Problem List for this Hospitalization (present on admission):    Principal Problem (Resolved):    GARY (acute kidney injury) ([facility] [address]  Active Problems:    Hypothyroidism    GERD (gastroesophageal reflux disease)    Bariatric surgery status    Hypertension    Anxiety  Resolved Problems:    High anion gap metabolic acidosis    Starvation ketoacidosis    Hypercalcemia      Hospital Course:  Ms. Jose Murray is a nice 46 y/o female with a h/o obesity s/p sleeve gastrectomy (in [facility]) who was admitted on 10/19 with GARY secondary to poor PO intake due to a 10 day Druze fasting. Her labs also showed an elevated AGMA. She was started on a sodium bicarbonate infusion and admitted. She had essentially no oral intake in the 5 days prior to admission. Her renal function improved daily and is now off fluids. Her potassium will be replaced today (low due to bicarb). She is ambulating and tolerating oral intake. She was seen by Dietician to ensure proper supplementation given her post-gastrectomy status. She is medically stable for discharge home today. Disposition: Home  Diet: ADULT DIET; Regular; Low Fat/Low Chol/High Fiber/MAYNOR  Code Status: Full Code    Follow Ups:   Follow-up Information     Sparkle Mcguire MD Follow up. Specialty: Family Medicine  Contact information:  [address] [place] [address]  [phone]                       Time spent in patient discharge and coordination 35 minutes. Follow up labs/diagnostics (ultimately defer to outpatient provider):  N/A    Plan was discussed with patient, RN, CM. All questions answered.   Patient was stable at time of discharge. Instructions given to call a physician or return if any concerns. Current Discharge Medication List        CONTINUE these medications which have NOT CHANGED    Details   omeprazole (PRILOSEC) 10 MG delayed release capsule Take 10 mg by mouth 2 times daily      simethicone (MYLICON) 80 MG chewable tablet Take 80 mg by mouth every 6 hours as needed for Flatulence      levothyroxine (SYNTHROID) 100 MCG tablet Take 100 mcg by mouth every morning (before breakfast)           STOP taking these medications       triamterene-hydroCHLOROthiazide (MAXZIDE-25) 37.5-25 MG per tablet Comments:   Reason for Stopping:               Procedures done this admission:  * No surgery found *    Consults this admission:  IP CONSULT TO DIETITIAN    Echocardiogram results:  No results found for this or any previous visit. Diagnostic Imaging/Tests:   XR CHEST PORTABLE    Result Date: 10/19/2022  Normal portable chest x-ray.        Labs: Results:       BMP, Mg, Phos Recent Labs     10/19/22  1810 10/19/22  2109 10/20/22  0259 10/21/22  0519    141 139 140   K 5.5* 4.2 3.4* 2.7*    111* 108 100*   CO2 11* 12* 16* 33*   ANIONGAP 19* 18* 15* 7   BUN 21 20 18 13   CREATININE 1.80* 1.80* 1.43* 1.15*   LABGLOM 33* 33* 44* 57*   CALCIUM 11.5* 11.1* 10.0  10.0 9.8   GLUCOSE 84 85 138* 121*   MG 2.6*  --  2.0 1.8      CBC Recent Labs     10/19/22  1810 10/20/22  0259   WBC 8.7 8.7   RBC 5.48* 4.45   HGB 15.5* 12.4   HCT 48.8* 37.2   MCV 89.1 83.6   MCH 28.3 27.9   MCHC 31.8 33.3   RDW 14.6 14.2   * 443   MPV 10.7 10.2   NRBC 0.00 0.00   SEGS  --  50   LYMPHOPCT  --  40   EOSRELPCT  --  2   MONOPCT  --  7   BASOPCT  --  1   IMMGRAN  --  0   SEGSABS  --  4.3   LYMPHSABS  --  3.5   EOSABS  --  0.2   MONOSABS  --  0.6   BASOSABS  --  0.1   ABSIMMGRAN  --  0.0      LFT Recent Labs     10/19/22  1810 10/20/22  0259   BILITOT 0.8 0.6   ALKPHOS 122 100   AST 43* 15   ALT 20 15   PROT 9.0* 7.4 LABALBU 4.3 3.3*   GLOB 4.7* 4.1      Cardiac  No results found for: NTPROBNP, TROPHS   Coags No results found for: PROTIME, INR, APTT   A1c No results found for: LABA1C, EAG   Lipids Lab Results   Component Value Date/Time    CHOL 167 07/16/2021 12:05 PM    LDLCALC 100.6 07/16/2021 12:05 PM    LABVLDL 15.4 07/16/2021 12:05 PM    HDL 51 07/16/2021 12:05 PM    CHOLHDLRATIO 3.3 07/16/2021 12:05 PM    TRIG 77 07/16/2021 12:05 PM      Thyroid  Lab Results   Component Value Date/Time    TSHELE 3.15 10/19/2022 06:10 PM        Most Recent UA Lab Results   Component Value Date/Time    COLORU YELLOW 06/01/2022 03:45 PM    APPEARANCE CLEAR 06/01/2022 03:45 PM    SPECGRAV 1.015 06/01/2022 03:45 PM    LABPH 5.5 06/01/2022 03:45 PM    PROTEINU Negative 06/01/2022 03:45 PM    GLUCOSEU Negative 06/01/2022 03:45 PM    KETUA >160 06/01/2022 03:45 PM    BILIRUBINUR SMALL 06/01/2022 03:45 PM    BLOODU Trace Intact 06/01/2022 03:45 PM    UROBILINOGEN 0.2 06/01/2022 03:45 PM    NITRU Negative 06/01/2022 03:45 PM    LEUKOCYTESUR Negative 06/01/2022 03:45 PM    WBCUA 0-3 06/01/2022 03:45 PM    RBCUA 0 06/01/2022 03:45 PM    EPITHUA 5-10 06/01/2022 03:45 PM    BACTERIA TRACE 06/01/2022 03:45 PM    LABCAST 0 06/01/2022 03:45 PM    MUCUS 0 06/01/2022 03:45 PM        No results for input(s): CULTURE in the last 720 hours.     All Labs from Last 24 Hrs:  Recent Results (from the past 24 hour(s))   Basic Metabolic Panel w/ Reflex to MG    Collection Time: 10/21/22  5:19 AM   Result Value Ref Range    Sodium 140 133 - 143 mmol/L    Potassium 2.7 (L) 3.5 - 5.1 mmol/L    Chloride 100 (L) 101 - 110 mmol/L    CO2 33 (H) 21 - 32 mmol/L    Anion Gap 7 2 - 11 mmol/L    Glucose 121 (H) 65 - 100 mg/dL    BUN 13 6 - 23 MG/DL    Creatinine 1.15 (H) 0.6 - 1.0 MG/DL    Est, Glom Filt Rate 57 (L) >60 ml/min/1.73m2    Calcium 9.8 8.3 - 10.4 MG/DL   Magnesium    Collection Time: 10/21/22  5:19 AM   Result Value Ref Range    Magnesium 1.8 1.8 - 2.4 mg/dL Allergies   Allergen Reactions    Oxycodone-Acetaminophen Itching     Immunization History   Administered Date(s) Administered    Influenza Virus Vaccine 01/01/2012       Recent Vital Data:  Patient Vitals for the past 24 hrs:   Temp Pulse Resp BP SpO2   10/21/22 0731 97.5 °F (36.4 °C) 76 18 128/74 96 %   10/21/22 0343 97.3 °F (36.3 °C) 79 18 117/73 100 %   10/20/22 2355 97.5 °F (36.4 °C) 78 16 108/69 99 %   10/20/22 2045 -- 78 -- 113/71 98 %   10/20/22 1947 98.2 °F (36.8 °C) (!) 107 18 110/70 96 %   10/20/22 1534 98.2 °F (36.8 °C) 98 -- 125/70 97 %   10/20/22 1116 98.1 °F (36.7 °C) (!) 115 17 105/80 97 %       Oxygen Therapy  SpO2: 96 %  O2 Device: None (Room air)    Estimated body mass index is 29.02 kg/m² as calculated from the following:    Height as of this encounter: 5' 3\" (1.6 m). Weight as of this encounter: 163 lb 12.8 oz (74.3 kg). Intake/Output Summary (Last 24 hours) at 10/21/2022 0943  Last data filed at 10/20/2022 1715  Gross per 24 hour   Intake 720 ml   Output 200 ml   Net 520 ml         Physical Exam:  General:    Well nourished. No overt distress. Overweight. Head:  Normocephalic, atraumatic  Eyes:  Sclerae appear normal.  Pupils equally round. HENT:  Nares appear normal, no drainage. Moist mucous membranes  Neck:  No restricted ROM. Trachea midline  CV:   RRR. No m/r/g. No JVD  Lungs:   CTAB. No wheezing, rhonchi, or rales. Respirations even, unlabored. Abdomen:   Soft, nontender, nondistended. Extremities: Warm and dry. No cyanosis or clubbing. No edema. Skin:     No rashes. Normal coloration  Neuro:  CN II-XII grossly intact. A/O x3. Psych:  Normal mood and affect. Signed:  Surekha Fagan MD    Part of this note may have been written by using a voice dictation software. The note has been proof read but may still contain some grammatical/other typographical errors.

## 2022-10-21 NOTE — CARE COORDINATION
10/21/22 [address] is: Legal Next of Kin   Social/Functional History   Lives With Spouse   Type of [address] [place] [address] [id number]   Ambulation Assistance Independent   Transfer Assistance Independent   Active  Yes  (she can drive says spouse does the driving)   Mode of Transportation Car   Discharge Planning   Type of [address] Prior To Admission None   Potential Assistance Needed N/A   Type of [address] Discharge   Services At/After Discharge None   Condition of Participation: Discharge Planning   The Plan for Transition of Care is related to the following treatment goals: Home with spouse     The patient is discharging home. Per chart review, no case management needs were identified. The patient was evaluated by therapy services.

## 2022-10-21 NOTE — PROGRESS NOTES
Nutrition:  SWL RD visit appreciated. .  Provided with SWL recipes that pt selected from [employer] [place]  Referral made for follow up with outpatient individual nutrition counseling.     Enedina Francisco, RD,LD, [address] [facility]